# Patient Record
Sex: FEMALE | Race: BLACK OR AFRICAN AMERICAN | NOT HISPANIC OR LATINO | Employment: FULL TIME | URBAN - METROPOLITAN AREA
[De-identification: names, ages, dates, MRNs, and addresses within clinical notes are randomized per-mention and may not be internally consistent; named-entity substitution may affect disease eponyms.]

---

## 2017-05-01 ENCOUNTER — HISTORICAL (OUTPATIENT)
Dept: INTERNAL MEDICINE | Facility: CLINIC | Age: 47
End: 2017-05-01

## 2018-04-23 ENCOUNTER — HISTORICAL (OUTPATIENT)
Dept: ADMINISTRATIVE | Facility: HOSPITAL | Age: 48
End: 2018-04-23

## 2018-04-23 LAB
ABS NEUT (OLG): 2.84 X10(3)/MCL (ref 2.1–9.2)
ALBUMIN SERPL-MCNC: 3.8 GM/DL (ref 3.4–5)
ALBUMIN/GLOB SERPL: 1 RATIO (ref 1–2)
ALP SERPL-CCNC: 135 UNIT/L (ref 45–117)
ALT SERPL-CCNC: 17 UNIT/L (ref 12–78)
APPEARANCE, UA: CLEAR
AST SERPL-CCNC: 16 UNIT/L (ref 15–37)
BACTERIA #/AREA URNS AUTO: ABNORMAL /[HPF]
BASOPHILS # BLD AUTO: 0.04 X10(3)/MCL
BASOPHILS NFR BLD AUTO: 1 %
BILIRUB SERPL-MCNC: 0.4 MG/DL (ref 0.2–1)
BILIRUB UR QL STRIP: NEGATIVE
BILIRUBIN DIRECT+TOT PNL SERPL-MCNC: 0.1 MG/DL
BILIRUBIN DIRECT+TOT PNL SERPL-MCNC: 0.3 MG/DL
BUN SERPL-MCNC: 10 MG/DL (ref 7–18)
CALCIUM SERPL-MCNC: 9.8 MG/DL (ref 8.5–10.1)
CHLORIDE SERPL-SCNC: 103 MMOL/L (ref 98–107)
CHOLEST SERPL-MCNC: 181 MG/DL
CHOLEST/HDLC SERPL: 3 {RATIO} (ref 0–4.4)
CO2 SERPL-SCNC: 26 MMOL/L (ref 21–32)
COLOR UR: YELLOW
CREAT SERPL-MCNC: 0.8 MG/DL (ref 0.6–1.3)
EOSINOPHIL # BLD AUTO: 0.15 X10(3)/MCL
EOSINOPHIL NFR BLD AUTO: 3 %
ERYTHROCYTE [DISTWIDTH] IN BLOOD BY AUTOMATED COUNT: 13.3 % (ref 11.5–14.5)
EST. AVERAGE GLUCOSE BLD GHB EST-MCNC: 128 MG/DL
GLOBULIN SER-MCNC: 4.5 GM/ML (ref 2.3–3.5)
GLUCOSE (UA): NORMAL
GLUCOSE SERPL-MCNC: 88 MG/DL (ref 74–106)
HAV IGM SERPL QL IA: NONREACTIVE
HBA1C MFR BLD: 6.1 % (ref 4.2–6.3)
HBV CORE IGM SERPL QL IA: NONREACTIVE
HBV SURFACE AG SERPL QL IA: NEGATIVE
HCT VFR BLD AUTO: 39.4 % (ref 35–46)
HCV AB SERPL QL IA: NONREACTIVE
HDLC SERPL-MCNC: 60 MG/DL
HGB BLD-MCNC: 13.2 GM/DL (ref 12–16)
HGB UR QL STRIP: NEGATIVE
HIV 1+2 AB+HIV1 P24 AG SERPL QL IA: NONREACTIVE
HYALINE CASTS #/AREA URNS LPF: ABNORMAL /[LPF]
IMM GRANULOCYTES # BLD AUTO: 0.04 10*3/UL
IMM GRANULOCYTES NFR BLD AUTO: 1 %
KETONES UR QL STRIP: NEGATIVE
LDLC SERPL CALC-MCNC: 103 MG/DL (ref 0–130)
LEUKOCYTE ESTERASE UR QL STRIP: NEGATIVE
LYMPHOCYTES # BLD AUTO: 1.41 X10(3)/MCL
LYMPHOCYTES NFR BLD AUTO: 29 % (ref 13–40)
MCH RBC QN AUTO: 30.1 PG (ref 26–34)
MCHC RBC AUTO-ENTMCNC: 33.5 GM/DL (ref 31–37)
MCV RBC AUTO: 89.7 FL (ref 80–100)
MONOCYTES # BLD AUTO: 0.35 X10(3)/MCL
MONOCYTES NFR BLD AUTO: 7 % (ref 4–12)
NEUTROPHILS # BLD AUTO: 2.84 X10(3)/MCL
NEUTROPHILS NFR BLD AUTO: 59 X10(3)/MCL
NITRITE UR QL STRIP: NEGATIVE
PH UR STRIP: 6.5 [PH] (ref 4.5–8)
PLATELET # BLD AUTO: 382 X10(3)/MCL (ref 130–400)
PMV BLD AUTO: 9.4 FL (ref 7.4–10.4)
POTASSIUM SERPL-SCNC: 4.1 MMOL/L (ref 3.5–5.1)
PROT SERPL-MCNC: 8.3 GM/DL (ref 6.4–8.2)
PROT UR QL STRIP: 10 MG/DL
RBC # BLD AUTO: 4.39 X10(6)/MCL (ref 4–5.2)
RBC #/AREA URNS AUTO: ABNORMAL /[HPF]
RPR SER QL: NORMAL
SODIUM SERPL-SCNC: 137 MMOL/L (ref 136–145)
SP GR UR STRIP: 1.02 (ref 1–1.03)
SQUAMOUS #/AREA URNS LPF: ABNORMAL /[LPF]
T PALLIDUM AB SER QL: REACTIVE
TRIGL SERPL-MCNC: 88 MG/DL
TSH SERPL-ACNC: 0.86 MIU/L (ref 0.36–3.74)
UROBILINOGEN UR STRIP-ACNC: NORMAL
VLDLC SERPL CALC-MCNC: 18 MG/DL
WBC # SPEC AUTO: 4.8 X10(3)/MCL (ref 4.5–11)
WBC #/AREA URNS AUTO: ABNORMAL /HPF

## 2018-06-29 ENCOUNTER — HISTORICAL (OUTPATIENT)
Dept: RADIOLOGY | Facility: HOSPITAL | Age: 48
End: 2018-06-29

## 2019-07-03 LAB — POC BETA-HCG (QUAL): NEGATIVE

## 2019-07-16 ENCOUNTER — HISTORICAL (OUTPATIENT)
Dept: RADIOLOGY | Facility: HOSPITAL | Age: 49
End: 2019-07-16

## 2019-09-04 ENCOUNTER — HISTORICAL (OUTPATIENT)
Dept: INTERNAL MEDICINE | Facility: CLINIC | Age: 49
End: 2019-09-04

## 2019-09-04 LAB
ABS NEUT (OLG): 1.16 X10(3)/MCL (ref 2.1–9.2)
ALBUMIN SERPL-MCNC: 3.7 GM/DL (ref 3.4–5)
ALBUMIN/GLOB SERPL: 0.9 RATIO (ref 1.1–2)
ALP SERPL-CCNC: 138 UNIT/L (ref 45–117)
ALT SERPL-CCNC: 38 UNIT/L (ref 12–78)
AST SERPL-CCNC: 32 UNIT/L (ref 15–37)
BASOPHILS # BLD AUTO: 0 X10(3)/MCL (ref 0–0.2)
BASOPHILS NFR BLD AUTO: 1 %
BILIRUB SERPL-MCNC: 0.4 MG/DL (ref 0.2–1)
BILIRUBIN DIRECT+TOT PNL SERPL-MCNC: 0.1 MG/DL
BILIRUBIN DIRECT+TOT PNL SERPL-MCNC: 0.3 MG/DL
BUN SERPL-MCNC: 10 MG/DL (ref 7–18)
CALCIUM SERPL-MCNC: 8.5 MG/DL (ref 8.5–10.1)
CHLORIDE SERPL-SCNC: 105 MMOL/L (ref 98–107)
CHOLEST SERPL-MCNC: 226 MG/DL
CHOLEST/HDLC SERPL: 3 {RATIO} (ref 0–4.4)
CO2 SERPL-SCNC: 28 MMOL/L (ref 21–32)
CREAT SERPL-MCNC: 0.7 MG/DL (ref 0.6–1.3)
EOSINOPHIL # BLD AUTO: 0.2 X10(3)/MCL (ref 0–0.9)
EOSINOPHIL NFR BLD AUTO: 6 %
ERYTHROCYTE [DISTWIDTH] IN BLOOD BY AUTOMATED COUNT: 13.3 % (ref 11.5–14.5)
EST. AVERAGE GLUCOSE BLD GHB EST-MCNC: 120 MG/DL
GLOBULIN SER-MCNC: 3.9 GM/ML (ref 2.3–3.5)
GLUCOSE SERPL-MCNC: 88 MG/DL (ref 74–106)
HBA1C MFR BLD: 5.8 % (ref 4.2–6.3)
HCT VFR BLD AUTO: 36.9 % (ref 35–46)
HDLC SERPL-MCNC: 75 MG/DL
HGB BLD-MCNC: 12.3 GM/DL (ref 12–16)
IMM GRANULOCYTES # BLD AUTO: 0.02 10*3/UL
IMM GRANULOCYTES NFR BLD AUTO: 1 %
LDLC SERPL CALC-MCNC: 137 MG/DL (ref 0–130)
LYMPHOCYTES # BLD AUTO: 1.5 X10(3)/MCL (ref 0.6–4.6)
LYMPHOCYTES NFR BLD AUTO: 46 %
MCH RBC QN AUTO: 29.6 PG (ref 26–34)
MCHC RBC AUTO-ENTMCNC: 33.3 GM/DL (ref 31–37)
MCV RBC AUTO: 88.7 FL (ref 80–100)
MONOCYTES # BLD AUTO: 0.4 X10(3)/MCL (ref 0.1–1.3)
MONOCYTES NFR BLD AUTO: 11 %
NEUTROPHILS # BLD AUTO: 1.16 X10(3)/MCL (ref 2.1–9.2)
NEUTROPHILS NFR BLD AUTO: 35 %
PLATELET # BLD AUTO: 320 X10(3)/MCL (ref 130–400)
PMV BLD AUTO: 8.8 FL (ref 7.4–10.4)
POTASSIUM SERPL-SCNC: 3.9 MMOL/L (ref 3.5–5.1)
PROT SERPL-MCNC: 7.6 GM/DL (ref 6.4–8.2)
RBC # BLD AUTO: 4.16 X10(6)/MCL (ref 4–5.2)
SODIUM SERPL-SCNC: 138 MMOL/L (ref 136–145)
T3FREE SERPL-MCNC: 2.29 PG/ML (ref 2.18–3.98)
T4 FREE SERPL-MCNC: 0.82 NG/DL (ref 0.76–1.46)
TRIGL SERPL-MCNC: 68 MG/DL
TSH SERPL-ACNC: 1.18 MIU/L (ref 0.36–3.74)
VLDLC SERPL CALC-MCNC: 14 MG/DL
WBC # SPEC AUTO: 3.3 X10(3)/MCL (ref 4.5–11)

## 2019-11-06 ENCOUNTER — HISTORICAL (OUTPATIENT)
Dept: WOUND CARE | Facility: HOSPITAL | Age: 49
End: 2019-11-06

## 2019-11-06 LAB
ABS NEUT (OLG): 3.25 X10(3)/MCL (ref 2.1–9.2)
B-HCG SERPL QL: NEGATIVE
BASOPHILS # BLD AUTO: 0 X10(3)/MCL (ref 0–0.2)
BASOPHILS NFR BLD AUTO: 0 %
EOSINOPHIL # BLD AUTO: 0.4 X10(3)/MCL (ref 0–0.9)
EOSINOPHIL NFR BLD AUTO: 6 %
ERYTHROCYTE [DISTWIDTH] IN BLOOD BY AUTOMATED COUNT: 14.2 % (ref 11.5–14.5)
HCT VFR BLD AUTO: 34.2 % (ref 35–46)
HGB BLD-MCNC: 11.4 GM/DL (ref 12–16)
IMM GRANULOCYTES # BLD AUTO: 0.03 10*3/UL
IMM GRANULOCYTES NFR BLD AUTO: 0 %
LYMPHOCYTES # BLD AUTO: 2 X10(3)/MCL (ref 0.6–4.6)
LYMPHOCYTES NFR BLD AUTO: 31 %
MCH RBC QN AUTO: 29.8 PG (ref 26–34)
MCHC RBC AUTO-ENTMCNC: 33.3 GM/DL (ref 31–37)
MCV RBC AUTO: 89.3 FL (ref 80–100)
MONOCYTES # BLD AUTO: 0.6 X10(3)/MCL (ref 0.1–1.3)
MONOCYTES NFR BLD AUTO: 10 %
NEUTROPHILS # BLD AUTO: 3.25 X10(3)/MCL (ref 2.1–9.2)
NEUTROPHILS NFR BLD AUTO: 52 %
PLATELET # BLD AUTO: 354 X10(3)/MCL (ref 130–400)
PMV BLD AUTO: 8.4 FL (ref 7.4–10.4)
RBC # BLD AUTO: 3.83 X10(6)/MCL (ref 4–5.2)
WBC # SPEC AUTO: 6.2 X10(3)/MCL (ref 4.5–11)

## 2020-02-26 LAB
POC BETA-HCG (QUAL): NEGATIVE
POC BETA-HCG (QUAL): NEGATIVE

## 2020-07-09 ENCOUNTER — HISTORICAL (OUTPATIENT)
Dept: RADIOLOGY | Facility: HOSPITAL | Age: 50
End: 2020-07-09

## 2021-03-11 ENCOUNTER — HISTORICAL (OUTPATIENT)
Dept: RADIOLOGY | Facility: HOSPITAL | Age: 51
End: 2021-03-11

## 2021-06-08 ENCOUNTER — HISTORICAL (OUTPATIENT)
Dept: ANESTHESIOLOGY | Facility: HOSPITAL | Age: 51
End: 2021-06-08

## 2021-06-08 LAB
B-HCG SERPL QL: NEGATIVE
CRC RECOMMENDATION EXT: NORMAL

## 2021-08-25 ENCOUNTER — HISTORICAL (OUTPATIENT)
Dept: RADIOLOGY | Facility: HOSPITAL | Age: 51
End: 2021-08-25

## 2021-10-25 ENCOUNTER — HISTORICAL (OUTPATIENT)
Dept: RADIOLOGY | Facility: HOSPITAL | Age: 51
End: 2021-10-25

## 2021-11-29 ENCOUNTER — HISTORICAL (OUTPATIENT)
Dept: LAB | Facility: HOSPITAL | Age: 51
End: 2021-11-29

## 2021-11-29 LAB
ALBUMIN SERPL-MCNC: 3.8 GM/DL (ref 3.5–5)
ALBUMIN/GLOB SERPL: 1.2 RATIO (ref 1.1–2)
ALP SERPL-CCNC: 121 UNIT/L (ref 40–150)
ALT SERPL-CCNC: 102 UNIT/L (ref 0–55)
AST SERPL-CCNC: 62 UNIT/L (ref 5–34)
BILIRUB SERPL-MCNC: 0.4 MG/DL
BILIRUBIN DIRECT+TOT PNL SERPL-MCNC: 0.2 MG/DL (ref 0–0.5)
BILIRUBIN DIRECT+TOT PNL SERPL-MCNC: 0.2 MG/DL (ref 0–0.8)
BUN SERPL-MCNC: 16 MG/DL (ref 9.8–20.1)
CALCIUM SERPL-MCNC: 9.2 MG/DL (ref 8.7–10.5)
CHLORIDE SERPL-SCNC: 103 MMOL/L (ref 98–107)
CHOLEST SERPL-MCNC: 160 MG/DL
CHOLEST/HDLC SERPL: 3 {RATIO} (ref 0–5)
CO2 SERPL-SCNC: 29 MMOL/L (ref 22–29)
CREAT SERPL-MCNC: 0.64 MG/DL (ref 0.55–1.02)
EST. AVERAGE GLUCOSE BLD GHB EST-MCNC: 116.9 MG/DL
FSH SERPL-ACNC: 21.36 MIU/ML
GLOBULIN SER-MCNC: 3.1 GM/DL (ref 2.4–3.5)
GLUCOSE SERPL-MCNC: 88 MG/DL (ref 74–100)
HBA1C MFR BLD: 5.7 %
HDLC SERPL-MCNC: 53 MG/DL (ref 35–60)
LDLC SERPL CALC-MCNC: 92 MG/DL (ref 50–140)
POTASSIUM SERPL-SCNC: 4.4 MMOL/L (ref 3.5–5.1)
PROT SERPL-MCNC: 6.9 GM/DL (ref 6.4–8.3)
SODIUM SERPL-SCNC: 138 MMOL/L (ref 136–145)
TRIGL SERPL-MCNC: 74 MG/DL (ref 37–140)
VLDLC SERPL CALC-MCNC: 15 MG/DL

## 2021-12-03 ENCOUNTER — HISTORICAL (OUTPATIENT)
Dept: RADIOLOGY | Facility: HOSPITAL | Age: 51
End: 2021-12-03

## 2022-04-10 ENCOUNTER — HISTORICAL (OUTPATIENT)
Dept: ADMINISTRATIVE | Facility: HOSPITAL | Age: 52
End: 2022-04-10

## 2022-04-28 VITALS
SYSTOLIC BLOOD PRESSURE: 120 MMHG | BODY MASS INDEX: 29.45 KG/M2 | OXYGEN SATURATION: 98 % | DIASTOLIC BLOOD PRESSURE: 69 MMHG | HEIGHT: 62 IN | WEIGHT: 160.06 LBS

## 2022-04-30 NOTE — OP NOTE
Patient:   Radha Finch             MRN: [id number]            FIN: [id number]               Age:   50 years     Sex:  Female     :  1970   Associated Diagnoses:   Occult blood in stool   Author:   Katelynn Guevara MD      Operative Note   Operative Information   Date/ Time:  2021 11:30:00.     Procedures Performed: Procedure Code   Colonoscopy, flexible; diagnostic, including collection of specimen(s) by brushing or washing, when performed (separate procedure) (13176)..     Indications: 50-year-old -American female with recent fecal occult blood testing positive in need of first age-appropriate colonoscopy.     Preoperative Diagnosis: Occult blood in stool (NWO16-AT R19.5).     Postoperative Diagnosis: Occult blood in stool (CEN04-IN R19.5).     Surgeon: Katelynn Guevara MD.     Anesthesia: Intravenous MAC.     Speciman Removed: None.     Description of Procedure/Findings/    Complications: Patient was brought to the endoscopy suite laid in a left lateral decubitus position right side up.  Intravenous anesthesia was provided.  Digital rectal exam performed exhibiting good anal rectal tone and no masses.  An endoscope scope was then passed through the anus intubating the rectum and with gentle deflation reaching the cecum.  Upon reaching the cecum pictures were taken the scope was then slowly withdrawn.  Overall the cecum ascending transverse descending and rectosigmoid colon all appear to be grossly normal without mass polyp or ulceration seen.  Scope was retroflexed in the distal rectum revealing normal-appearing perianal mucosa no significant hemorrhoids.  The colon was evacuated of air.  The patient was relieved of anesthesia stable condition transfer postanesthesia care unit..     Esimated blood loss: No blood loss.     Findings: Normal colonoscopy.     Complications: None.     Notes: Recommendation repeat colonoscopy at a 10-year interval .

## 2022-05-03 NOTE — HISTORICAL [facility] CERNER
This is a historical note converted from [employer]. Formatting and pictures may have been removed.  Please reference [employer] for original formatting and attached multimedia. Chief Complaint  annual  History of Present Illness  Pt is  here for annual gyn exam. Denies hx of abnormal pap. Last pap 3/2016=NIL; HPV negative. Pt currently on sprintec for contraception and wishes to continue. LMP 3-31-18. States has regular monthly period lasting 5 days. Changes pad 5-6x per day. Reports mild cramping during menses otherwise denies abd/pelvic pain. Denies breast complaints. Pt with personal hx of left breast biopsy that was benign. Fly hx includes paternal aunt with breast cancer; denies fly hx of uterine, ovarian, or colon cancer. Pt is nonsmoker.  Review of Systems  Negative except HPI  Physical Exam  Vitals & Measurements  T:?36.9? ?C (Oral)? HR:?83(Peripheral)? RR:?18? BP:?119/77?  HT:?157.48?cm? HT:?157.48?cm? WT:?74.6?kg? WT:?74.6?kg? BMI:?30.08?  General: Alert and oriented, No acute distress.  Breast: No mass, No tenderness, No discharge.  Gastrointestinal: Soft, Non-tender.  Gynecology:  Labia: Bilateral, Majora, Minora, Within normal limits.  Vagina: Within normal limits. no abnormal discharge  Cervix: No cervical motion tenderness, No lesions.  Uterus: Mobile, Not tender. 12 weeks  Ovaries: Not tender, No mass.  Integumentary: Warm, Dry.  Neurologic: Alert, Oriented.  Psychiatric: Cooperative, Appropriate mood & affect.  ?   exam done by Genet Kennedy NP student; I was present for entire exam  Assessment/Plan  1.?Visit for routine gyn exam  ? pelvic; pap utd per acog guidelines  ? discussed finding?on pelvic?US done after last visit as we could not get a hold?of her, pt is pleased with sprintec and wishes to continue-instructed pt contact clinic with any?changes in her bleeding pattern  Ordered:  Clinic Follow up, *Est. 19 3:00:00 CDT, 1 Year, Order for future visit, Visit for routine gyn exam, [facility]  [facility] Est 40-64 years [address] PC, Visit for routine gyn exam  Routine screening for STI (sexually transmitted infection)  Contraception  Visit for screening mammogram, [facility], 04/23/18 10:13:00 CDT  ?  2.?Routine screening for STI (sexually transmitted infection)  Ordered:  Chlamydia trach and N. gonorrhea PCR, Routine collect, Cervical, Order for future visit, 04/23/18 10:13:00 CDT, Stop date 04/23/18 10:13:00 CDT, Nurse collect, Routine screening for STI (sexually transmitted infection)  Hepatitis Panel [facility], Now collect, 04/23/18 10:13:00 CDT, Blood, Order for future visit, Stop date 04/23/18 10:13:00 CDT, Lab Collect, Routine screening for STI (sexually transmitted infection), 04/23/18 10:13:00 CDT  HIV 1 and 2, Now collect, 04/23/18 10:13:00 CDT, Blood, Order for future visit, Stop date 04/23/18 10:13:00 CDT, Lab Collect, Routine screening for STI (sexually transmitted infection), 04/23/18 10:13:00 CDT  [facility] Est 40-64 years [address] PC, Visit for routine gyn exam  Routine screening for STI (sexually transmitted infection)  Contraception  Visit for screening mammogram, [facility], 04/23/18 10:13:00 CDT  Syphilis Antibody (with Reflex RPR), Routine collect, 04/23/18 10:13:00 CDT, Blood, Order for future visit, Stop date 04/23/18 10:13:00 CDT, Lab Collect, Routine screening for STI (sexually transmitted infection), 04/23/18 10:13:00 CDT  Wet Prep Smear, Routine collect, Vaginal, Order for future visit, 04/23/18 10:13:00 CDT, Stop date 04/23/18 10:13:00 CDT, Nurse collect, Routine screening for STI (sexually transmitted infection), 04/23/18 10:13:00 CDT  ?  3.?Contraception  ? refill sprintec; #2?per cdc guidelines for criteria for contraception use (advantages outweigh risk) for age over 40; instructed pt to contact clinic with any new diagnosis as that may affect the method that we use in the future  Ordered:  Preventative Health Care Est  40-64 years [id number] PC, Visit for routine gyn exam  Routine screening for STI (sexually transmitted infection)  Contraception  Visit for screening mammogram, [facility] , 18 10:13:00 CDT  ?  4.?Visit for screening mammogram  Ordered:  MG Screening, Routine, *Est. 18 3:00:00 CDT, Screening, None, Ambulatory, Rad Type, Order for future visit, Visit for screening mammogram, Schedule this test, [facility], Follow Breast Imaging Order Set Protocol, *Est. 18 3:00:00 CDT  Preventative Health Care Est 40-64 years [id number] PC, Visit for routine gyn exam  Routine screening for STI (sexually transmitted infection)  Contraception  Visit for screening mammogram, [facility], 18 10:13:00 CDT  ?  Orders:  ethinyl estradiol-norgestimate, 1 tab(s), Oral, Daily, # 28 tab(s), 11 Refill(s), Pharmacy: [id number]   Problem List/Past Medical History  Ongoing  WPW (Hang-Parkinson-White syndrome)  Historical  No qualifying data  Procedure/Surgical History   section.  Medications  Sprintec 0.25 mg-35 mcg oral tablet, 1 tab(s), Oral, Daily, 11 refills  Sprintec 0.25 mg-35 mcg oral tablet, 1 tab(s), Oral, Daily, 11 refills  Ventolin HFA 90 mcg/inh inhalation aerosol, 2 puff(s), INH, q4hr, PRN, 1 refills  Allergies  penicillins  Social History  Alcohol - Denies Alcohol Use, 2015  Past, 2015  Employment/School  Activity level: Moderate physical work. Operates hazardous equipment: No. Workplace hazards: Heavy lifting/twisting, Repetitive motion., 2015  Exercise  Exercise frequency: Daily. Self assessment: Fair condition. Exercise type: Walking., 2015  Home/Environment  Lives with Children, Spouse. Living situation: Home/Independent. Alcohol abuse in household: No. Substance abuse in household: No. Smoker in household: No. Injuries/Abuse/Neglect in household: No. Feels unsafe at home: No. Safe place to go: Yes. Family/Friends available for support:  Yes. Concern for family members at home: No. Major illness in household: No. Financial concerns: No. TV/Computer concerns: No., 03/24/2015  Nutrition/Health  Regular, Caffeine intake amount: sodas Dr. Pepper 3 20oz per day. Wants to lose weight: Yes. Sleeping concerns: Yes. Feels highly stressed: Yes., 03/24/2015  Other  Substance Abuse - Denies Substance Abuse, 03/19/2015  Never, 09/14/2015  Tobacco - Denies Tobacco Use, 03/19/2015  Never smoker, 09/14/2015  Family History  Coronary artery disease: Father.  Hypertension.: Mother.  Stroke: Father.  Diagnostic Results  (05/01/2017 10:30 CDT US Pelvic Non-Obsetrics)  * Final Report *  ?  Reason For Exam  Left lower quadrant pain;Pelvic Pain  ?  Radiology Report  US Transvaginal Non-OB, US Pelvic Non-Obstetrics  ?  REASON FOR STUDY: Left lower quadrant and pelvic pain  ?  TECHNIQUE: Transabdominal and endovaginal ultrasound of the pelvis.?  ?  COMPARISON: No relevant comparison studies available at the time of  dictation?  ?  FINDINGS:?  The uterus measures 12.8 x 7.3 x 7.4 cm. The myometrium is  heterogeneous. There are several hypoechoic areas noted. Larger  lesions include a posterior fibroid measuring 2.6 x 3.6 x 2.4 cm, a  left fibroid measuring 2.0 x 2.2 x 1.4 cm and an anterior fibroid  measuring 1.9 x 1.9 x 1.9 cm. Endometrial stripe measures 7 mm.  ?  The right ovary measures 3.8 x 1.7 x 2.2 cm. It has a normal  appearance. The left ovary is not identified. Trace free fluid within  the cul-de-sac.  ?  IMPRESSION:?  1. Enlarged and heterogeneous uterus with several fibroids. The  largest identified fibroid measures up to 3.6 cm.  2. Left ovary not identified.  3. Trace pelvic free fluid.  ?  ?  Signature Line  Electronically Signed By: Thomas Medeiros MD  Date/Time Signed: 05/01/2017 11:00  ?  ?  This document has an image  ?  Result type:???????  US Pelvic Non-Obsetrics  Result date:???????  May 01, 2017 10:30 CDT  Result status:???????  Auth  (Verified)  Result title:???????  US Pelvic Non-Obstetrics  Performed by:???????  Thomas Medeiros MD on May 01, 2017 10:54 CDT  Verified by:???????  Thomas Medeiros MD on May 01, 2017 11:00 CDT  Encounter info:???????  [id number], [facility], Outpatient, 5/1/2017 - 5/1/2017  ?  ?  ? [1]     [1]?US Pelvic Non-Obsetrics; Thomas Medeiros MD 05/01/2017 10:30 CDT

## 2022-05-30 ENCOUNTER — LAB VISIT (OUTPATIENT)
Dept: LAB | Facility: HOSPITAL | Age: 52
End: 2022-05-30
Attending: FAMILY MEDICINE
Payer: MEDICAID

## 2022-05-30 DIAGNOSIS — R42 DIZZINESS AND GIDDINESS: Primary | ICD-10-CM

## 2022-05-30 DIAGNOSIS — R74.8 ACID PHOSPHATASE ELEVATED: ICD-10-CM

## 2022-05-30 LAB
ALBUMIN SERPL-MCNC: 3.8 GM/DL (ref 3.5–5)
ALBUMIN/GLOB SERPL: 1.1 RATIO (ref 1.1–2)
ALP SERPL-CCNC: 148 UNIT/L (ref 40–150)
ALT SERPL-CCNC: 18 UNIT/L (ref 0–55)
AST SERPL-CCNC: 16 UNIT/L (ref 5–34)
BILIRUBIN DIRECT+TOT PNL SERPL-MCNC: 0.3 MG/DL
BUN SERPL-MCNC: 23 MG/DL (ref 9.8–20.1)
CALCIUM SERPL-MCNC: 9.5 MG/DL (ref 8.4–10.2)
CHLORIDE SERPL-SCNC: 99 MMOL/L (ref 98–107)
CO2 SERPL-SCNC: 30 MMOL/L (ref 22–29)
CREAT SERPL-MCNC: 0.85 MG/DL (ref 0.55–1.02)
GLOBULIN SER-MCNC: 3.5 GM/DL (ref 2.4–3.5)
GLUCOSE SERPL-MCNC: 88 MG/DL (ref 74–100)
POTASSIUM SERPL-SCNC: 3.7 MMOL/L (ref 3.5–5.1)
PROT SERPL-MCNC: 7.3 GM/DL (ref 6.4–8.3)
SODIUM SERPL-SCNC: 136 MMOL/L (ref 136–145)

## 2022-05-30 PROCEDURE — 36415 COLL VENOUS BLD VENIPUNCTURE: CPT

## 2022-05-30 PROCEDURE — 80053 COMPREHEN METABOLIC PANEL: CPT

## 2022-09-17 ENCOUNTER — HISTORICAL (OUTPATIENT)
Dept: ADMINISTRATIVE | Facility: HOSPITAL | Age: 52
End: 2022-09-17
Payer: MEDICAID

## 2022-11-09 ENCOUNTER — LAB VISIT (OUTPATIENT)
Dept: LAB | Facility: HOSPITAL | Age: 52
End: 2022-11-09
Attending: FAMILY MEDICINE
Payer: MEDICAID

## 2022-11-09 DIAGNOSIS — I10 HYPERTENSION, UNSPECIFIED TYPE: Primary | ICD-10-CM

## 2022-11-09 DIAGNOSIS — B37.31 VAGINAL YEAST INFECTION: ICD-10-CM

## 2022-11-09 DIAGNOSIS — Z08 VAGINAL PAP SMEAR FOLLOWING HYSTERECTOMY FOR MALIGNANCY: ICD-10-CM

## 2022-11-09 DIAGNOSIS — R73.03 PREDIABETES: ICD-10-CM

## 2022-11-09 DIAGNOSIS — J45.20 MILD INTERMITTENT ASTHMA WITHOUT COMPLICATION: ICD-10-CM

## 2022-11-09 DIAGNOSIS — Z90.710 VAGINAL PAP SMEAR FOLLOWING HYSTERECTOMY FOR MALIGNANCY: ICD-10-CM

## 2022-11-09 LAB
ALBUMIN SERPL-MCNC: 3.8 GM/DL (ref 3.5–5)
ALBUMIN/GLOB SERPL: 1.1 RATIO (ref 1.1–2)
ALP SERPL-CCNC: 169 UNIT/L (ref 40–150)
ALT SERPL-CCNC: 20 UNIT/L (ref 0–55)
AST SERPL-CCNC: 23 UNIT/L (ref 5–34)
BILIRUBIN DIRECT+TOT PNL SERPL-MCNC: 0.3 MG/DL
BUN SERPL-MCNC: 12 MG/DL (ref 9.8–20.1)
CALCIUM SERPL-MCNC: 9.5 MG/DL (ref 8.4–10.2)
CHLORIDE SERPL-SCNC: 104 MMOL/L (ref 98–107)
CHOLEST SERPL-MCNC: 182 MG/DL
CHOLEST/HDLC SERPL: 3 {RATIO} (ref 0–5)
CO2 SERPL-SCNC: 27 MMOL/L (ref 22–29)
CREAT SERPL-MCNC: 0.76 MG/DL (ref 0.55–1.02)
DEPRECATED CALCIDIOL+CALCIFEROL SERPL-MC: 23.7 NG/ML (ref 30–80)
ERYTHROCYTE [DISTWIDTH] IN BLOOD BY AUTOMATED COUNT: 14.3 % (ref 11.5–17)
EST. AVERAGE GLUCOSE BLD GHB EST-MCNC: 119.8 MG/DL
GFR SERPLBLD CREATININE-BSD FMLA CKD-EPI: >60 MLS/MIN/1.73/M2
GLOBULIN SER-MCNC: 3.5 GM/DL (ref 2.4–3.5)
GLUCOSE SERPL-MCNC: 93 MG/DL (ref 74–100)
HBA1C MFR BLD: 5.8 %
HCT VFR BLD AUTO: 36.7 % (ref 37–47)
HDLC SERPL-MCNC: 63 MG/DL (ref 35–60)
HGB BLD-MCNC: 11.9 GM/DL (ref 12–16)
LDLC SERPL CALC-MCNC: 102 MG/DL (ref 50–140)
MCH RBC QN AUTO: 29.1 PG (ref 27–31)
MCHC RBC AUTO-ENTMCNC: 32.4 MG/DL (ref 33–36)
MCV RBC AUTO: 89.7 FL (ref 80–94)
PLATELET # BLD AUTO: 329 X10(3)/MCL (ref 130–400)
PMV BLD AUTO: 9 FL (ref 7.4–10.4)
POTASSIUM SERPL-SCNC: 4.1 MMOL/L (ref 3.5–5.1)
PROT SERPL-MCNC: 7.3 GM/DL (ref 6.4–8.3)
RBC # BLD AUTO: 4.09 X10(6)/MCL (ref 4.2–5.4)
SODIUM SERPL-SCNC: 139 MMOL/L (ref 136–145)
TRIGL SERPL-MCNC: 86 MG/DL (ref 37–140)
TSH SERPL-ACNC: 0.97 UIU/ML (ref 0.35–4.94)
VLDLC SERPL CALC-MCNC: 17 MG/DL
WBC # SPEC AUTO: 3.8 X10(3)/MCL (ref 4.5–11.5)

## 2022-11-09 PROCEDURE — 82306 VITAMIN D 25 HYDROXY: CPT

## 2022-11-09 PROCEDURE — 83036 HEMOGLOBIN GLYCOSYLATED A1C: CPT

## 2022-11-09 PROCEDURE — 84443 ASSAY THYROID STIM HORMONE: CPT

## 2022-11-09 PROCEDURE — 80061 LIPID PANEL: CPT

## 2022-11-09 PROCEDURE — 80053 COMPREHEN METABOLIC PANEL: CPT

## 2022-11-09 PROCEDURE — 85027 COMPLETE CBC AUTOMATED: CPT

## 2022-11-09 PROCEDURE — 36415 COLL VENOUS BLD VENIPUNCTURE: CPT

## 2023-01-10 ENCOUNTER — HOSPITAL ENCOUNTER (OUTPATIENT)
Dept: RADIOLOGY | Facility: HOSPITAL | Age: 53
Discharge: HOME | End: 2023-01-10
Attending: FAMILY MEDICINE
Payer: MEDICAID

## 2023-01-10 DIAGNOSIS — M54.6 PAIN IN THORACIC SPINE: ICD-10-CM

## 2023-01-10 PROCEDURE — 72070 X-RAY EXAM THORAC SPINE 2VWS: CPT | Mod: TC

## 2023-01-31 ENCOUNTER — DOCUMENTATION ONLY (OUTPATIENT)
Dept: ADMINISTRATIVE | Facility: HOSPITAL | Age: 53
End: 2023-01-31
Payer: MEDICAID

## 2023-03-06 ENCOUNTER — HOSPITAL ENCOUNTER (OUTPATIENT)
Dept: RADIOLOGY | Facility: HOSPITAL | Age: 53
Discharge: HOME | End: 2023-03-06
Attending: FAMILY MEDICINE
Payer: MEDICAID

## 2023-03-06 DIAGNOSIS — Z12.31 BREAST CANCER SCREENING BY MAMMOGRAM: ICD-10-CM

## 2023-03-06 PROCEDURE — 77067 MAMMO DIGITAL SCREENING BILAT WITH TOMO: ICD-10-PCS | Mod: 26,,, | Performed by: STUDENT IN AN ORGANIZED HEALTH CARE EDUCATION/TRAINING PROGRAM

## 2023-03-06 PROCEDURE — 77063 BREAST TOMOSYNTHESIS BI: CPT | Mod: 26,,, | Performed by: STUDENT IN AN ORGANIZED HEALTH CARE EDUCATION/TRAINING PROGRAM

## 2023-03-06 PROCEDURE — 77067 SCR MAMMO BI INCL CAD: CPT | Mod: 26,,, | Performed by: STUDENT IN AN ORGANIZED HEALTH CARE EDUCATION/TRAINING PROGRAM

## 2023-03-06 PROCEDURE — 77063 MAMMO DIGITAL SCREENING BILAT WITH TOMO: ICD-10-PCS | Mod: 26,,, | Performed by: STUDENT IN AN ORGANIZED HEALTH CARE EDUCATION/TRAINING PROGRAM

## 2023-03-06 PROCEDURE — 77067 SCR MAMMO BI INCL CAD: CPT | Mod: TC

## 2023-05-15 ENCOUNTER — LAB VISIT (OUTPATIENT)
Dept: LAB | Facility: HOSPITAL | Age: 53
End: 2023-05-15
Attending: FAMILY MEDICINE
Payer: MEDICAID

## 2023-05-15 DIAGNOSIS — R73.03 PREDIABETES: Primary | ICD-10-CM

## 2023-05-15 DIAGNOSIS — E78.2 MIXED HYPERLIPIDEMIA: ICD-10-CM

## 2023-05-15 LAB
ALBUMIN SERPL-MCNC: 3.5 G/DL (ref 3.5–5)
ALBUMIN/GLOB SERPL: 1 RATIO (ref 1.1–2)
ALP SERPL-CCNC: 130 UNIT/L (ref 40–150)
ALT SERPL-CCNC: 14 UNIT/L (ref 0–55)
AST SERPL-CCNC: 18 UNIT/L (ref 5–34)
BILIRUBIN DIRECT+TOT PNL SERPL-MCNC: 0.3 MG/DL
BUN SERPL-MCNC: 17 MG/DL (ref 9.8–20.1)
CALCIUM SERPL-MCNC: 9.1 MG/DL (ref 8.4–10.2)
CHLORIDE SERPL-SCNC: 101 MMOL/L (ref 98–107)
CHOLEST SERPL-MCNC: 161 MG/DL
CHOLEST/HDLC SERPL: 3 {RATIO} (ref 0–5)
CO2 SERPL-SCNC: 28 MMOL/L (ref 22–29)
CREAT SERPL-MCNC: 0.75 MG/DL (ref 0.55–1.02)
EST. AVERAGE GLUCOSE BLD GHB EST-MCNC: 116.9 MG/DL
GFR SERPLBLD CREATININE-BSD FMLA CKD-EPI: >60 MLS/MIN/1.73/M2
GLOBULIN SER-MCNC: 3.6 GM/DL (ref 2.4–3.5)
GLUCOSE SERPL-MCNC: 106 MG/DL (ref 74–100)
HBA1C MFR BLD: 5.7 %
HDLC SERPL-MCNC: 51 MG/DL (ref 35–60)
LDLC SERPL CALC-MCNC: 94 MG/DL (ref 50–140)
POTASSIUM SERPL-SCNC: 3.5 MMOL/L (ref 3.5–5.1)
PROT SERPL-MCNC: 7.1 GM/DL (ref 6.4–8.3)
SODIUM SERPL-SCNC: 138 MMOL/L (ref 136–145)
TRIGL SERPL-MCNC: 78 MG/DL (ref 37–140)
VLDLC SERPL CALC-MCNC: 16 MG/DL

## 2023-05-15 PROCEDURE — 80053 COMPREHEN METABOLIC PANEL: CPT

## 2023-05-15 PROCEDURE — 36415 COLL VENOUS BLD VENIPUNCTURE: CPT

## 2023-05-15 PROCEDURE — 83036 HEMOGLOBIN GLYCOSYLATED A1C: CPT

## 2023-05-15 PROCEDURE — 80061 LIPID PANEL: CPT

## 2023-07-18 ENCOUNTER — HOSPITAL ENCOUNTER (EMERGENCY)
Facility: HOSPITAL | Age: 53
Discharge: HOME | End: 2023-07-18
Attending: INTERNAL MEDICINE
Payer: MEDICAID

## 2023-07-18 VITALS
SYSTOLIC BLOOD PRESSURE: 116 MMHG | TEMPERATURE: 98 F | HEIGHT: 62 IN | RESPIRATION RATE: 18 BRPM | DIASTOLIC BLOOD PRESSURE: 71 MMHG | HEART RATE: 71 BPM | OXYGEN SATURATION: 100 % | WEIGHT: 173 LBS | BODY MASS INDEX: 31.83 KG/M2

## 2023-07-18 DIAGNOSIS — H60.12 CELLULITIS OF HELIX OF LEFT EAR: Primary | ICD-10-CM

## 2023-07-18 PROCEDURE — 99284 EMERGENCY DEPT VISIT MOD MDM: CPT | Mod: 25

## 2023-07-18 PROCEDURE — 25000003 PHARM REV CODE 250: Performed by: NURSE PRACTITIONER

## 2023-07-18 PROCEDURE — 10060 I&D ABSCESS SIMPLE/SINGLE: CPT

## 2023-07-18 RX ORDER — HYDROCODONE BITARTRATE AND ACETAMINOPHEN 7.5; 325 MG/1; MG/1
1 TABLET ORAL EVERY 6 HOURS PRN
Qty: 12 TABLET | Refills: 0 | Status: ON HOLD | OUTPATIENT
Start: 2023-07-18 | End: 2023-07-24 | Stop reason: HOSPADM

## 2023-07-18 RX ORDER — MUPIROCIN 20 MG/G
OINTMENT TOPICAL 3 TIMES DAILY
Qty: 22 G | Refills: 0 | Status: SHIPPED | OUTPATIENT
Start: 2023-07-18 | End: 2023-07-25

## 2023-07-18 RX ORDER — SULFAMETHOXAZOLE AND TRIMETHOPRIM 800; 160 MG/1; MG/1
1 TABLET ORAL 2 TIMES DAILY
Qty: 14 TABLET | Refills: 0 | Status: ON HOLD | OUTPATIENT
Start: 2023-07-18 | End: 2023-07-24 | Stop reason: HOSPADM

## 2023-07-18 RX ORDER — LIDOCAINE HYDROCHLORIDE 10 MG/ML
5 INJECTION, SOLUTION EPIDURAL; INFILTRATION; INTRACAUDAL; PERINEURAL
Status: COMPLETED | OUTPATIENT
Start: 2023-07-18 | End: 2023-07-18

## 2023-07-18 RX ORDER — HYDROCODONE BITARTRATE AND ACETAMINOPHEN 10; 325 MG/1; MG/1
1 TABLET ORAL
Status: COMPLETED | OUTPATIENT
Start: 2023-07-18 | End: 2023-07-18

## 2023-07-18 RX ADMIN — LIDOCAINE HYDROCHLORIDE 50 MG: 10 INJECTION, SOLUTION EPIDURAL; INFILTRATION; INTRACAUDAL; PERINEURAL at 06:07

## 2023-07-18 RX ADMIN — HYDROCODONE BITARTRATE AND ACETAMINOPHEN 1 TABLET: 10; 325 TABLET ORAL at 06:07

## 2023-07-18 NOTE — Clinical Note
"Radha Carrillo" Stef was seen and treated in our emergency department on 7/18/2023.  She may return to work on 07/21/2023.       If you have any questions or concerns, please don't hesitate to call.      TIARA Jackson"

## 2023-07-18 NOTE — ED PROVIDER NOTES
Encounter Date: 7/18/2023       History     Chief Complaint   Patient presents with    Otalgia     Pt c/o LT ear swelling and pain since Wednesday. Seen at urgent care on Sunday and given clindamycin. Not getting any better.     Patient is a 52-year-old female presents emerged department complaints of left external ear pain and swelling since Wednesday.  States he was seen in urgent care and placed on clindamycin but not getting any better.  Patient has significant redness erythema and swelling to the external ear around the helical pérez.  She denies any fevers chills.    Review of patient's allergies indicates:   Allergen Reactions    Penicillins Shortness Of Breath     Other reaction(s): sob     No past medical history on file.  Past Surgical History:   Procedure Laterality Date    COLONOSCOPY  06/08/2021    CYSTOSCOPY  04/27/2022    HYSTERECTOMY, VAGINAL, LAPAROSCOPY-ASSISTED, WITH SALPINGO-OOPHORECTOY  04/27/2022     No family history on file.     Review of Systems   Constitutional:  Negative for activity change, appetite change and fever.   HENT:  Positive for ear discharge and ear pain. Negative for congestion, dental problem, drooling and sore throat.    Eyes:  Negative for pain, discharge, redness and itching.   Respiratory:  Negative for apnea and shortness of breath.    Cardiovascular:  Negative for chest pain.   Gastrointestinal:  Negative for nausea.   Genitourinary:  Negative for dysuria.   Musculoskeletal:  Negative for back pain.   Skin:  Negative for rash.   Neurological:  Negative for dizziness, facial asymmetry and weakness.   Hematological:  Does not bruise/bleed easily.   Psychiatric/Behavioral:  Negative for agitation and behavioral problems.    All other systems reviewed and are negative.    Physical Exam     Initial Vitals [07/18/23 1707]   BP Pulse Resp Temp SpO2   (!) 147/84 77 18 98.2 °F (36.8 °C) 100 %      MAP       --         Physical Exam    Nursing note and vitals  reviewed.  Constitutional: Vital signs are normal. She appears well-developed and well-nourished.  Non-toxic appearance. She does not have a sickly appearance.   HENT:   Head: Normocephalic and atraumatic.   Left external knee ear near the helical cruise is red erythematous swollen and very tender to touch.  Difficult to determine if there is any significant fluctuance due to patient's being very sensitive on exam and not tolerating palpation much.   Eyes: Conjunctivae, EOM and lids are normal. Lids are everted and swept, no foreign bodies found.   Neck: Trachea normal and phonation normal. Neck supple. No thyroid mass and no thyromegaly present.   Normal range of motion.   Full passive range of motion without pain.     Cardiovascular:  Normal rate, regular rhythm, S1 normal, S2 normal, normal heart sounds, intact distal pulses and normal pulses.           Musculoskeletal:      Cervical back: Full passive range of motion without pain, normal range of motion and neck supple.     Lymphadenopathy:     She has no cervical adenopathy.   Neurological: She is alert and oriented to person, place, and time. She has normal strength and normal reflexes.   Skin: Skin is warm, dry and intact. Capillary refill takes less than 2 seconds.   Psychiatric: She has a normal mood and affect. Her speech is normal and behavior is normal. Judgment normal. Cognition and memory are normal.       ED Course   Procedures  Labs Reviewed - No data to display       Imaging Results    None          Medications   LIDOcaine (PF) 10 mg/ml (1%) injection 50 mg (50 mg Infiltration Given by Provider 7/18/23 1841)   HYDROcodone-acetaminophen  mg per tablet 1 tablet (1 tablet Oral Given 7/18/23 1841)                 ED Course as of 07/18/23 1902 Tue Jul 18, 2023 1831 Risks benefits discussed of I&D or 18 gauge needle probe of this area.  I gave her this option and had some shared decision-making due to her intolerance of palpation around this  area due to it being so sensitive and tender.  From what I could feel it appears to be more indurated and firm so I do not suspect much but she does report that she had some foul-smelling drainage 2 days ago when this initially started.  Patient did elect to have lidocaine injected in this area to be investigated with 18 gauge needle for possible abscess.  Patient being moved from consult to stretcher so we can perform this procedure. [SL]      ED Course User Index  [SL] Salvador Montano, TIARA          Medical Decision Making  Patient is a 52-year-old female presents emerged department continued left ear pain and swelling despite antibiotics prescribed for an infection.  She states she is been taking clindamycin for 2 days and states pain has worsened as well as swelling.  She reports this wound did drain some foul-smelling pus like discharge when she initially started having the pain but has not drained since.    Problems Addressed:  Cellulitis of helix of left ear: acute illness or injury     Details: Patient has cellulitis of the left external ear.  Patient is on clindamycin but I do not feel with her infection that seeing coverage would be adequate so we will add Bactrim today as well as topical mupirocin ointment.  I&D was done here which did relieve about 2 meals of purulent bloody drainage from this abscess.  She does report some immediate relief when abscess was drained.  Swelling did improve as well as the redness and discoloration.  Medications since the pharmacy.  Pain medicine given here.  Discussed starting antibiotics since possible.  Strict ER return precautions discussed for any worsening or changing symptoms.  Patient was aware plan of care and had no questions or concerns prior to discharge.    Amount and/or Complexity of Data Reviewed  External Data Reviewed: labs, radiology and notes.            Clinical Impression:   Final diagnoses:  [H60.12] Cellulitis of helix of left ear (Primary)        ED  Disposition Condition    Discharge Stable          ED Prescriptions       Medication Sig Dispense Start Date End Date Auth. Provider    sulfamethoxazole-trimethoprim 800-160mg (BACTRIM DS) 800-160 mg Tab Take 1 tablet by mouth 2 (two) times daily. for 7 days 14 tablet 7/18/2023 7/25/2023 TIARA Jackson    mupirocin (BACTROBAN) 2 % ointment Apply topically 3 (three) times daily. for 7 days 22 g 7/18/2023 7/25/2023 TIARA Jackson    HYDROcodone-acetaminophen (NORCO) 7.5-325 mg per tablet Take 1 tablet by mouth every 6 (six) hours as needed for Pain. 12 tablet 7/18/2023 -- TIARA Jackson          Follow-up Information       Follow up With Specialties Details Why Contact Info    Dina Sofia MD Family Medicine Schedule an appointment as soon as possible for a visit  As needed, For ER Follow Up. [address]  [facility] [place] [address]  [phone]               TIARA Jackson  07/18/23 [id number]

## 2023-07-21 ENCOUNTER — HOSPITAL ENCOUNTER (INPATIENT)
Facility: HOSPITAL | Age: 53
LOS: 3 days | Discharge: HOME | DRG: 156 | End: 2023-07-24
Attending: FAMILY MEDICINE | Admitting: FAMILY MEDICINE
Payer: MEDICAID

## 2023-07-21 ENCOUNTER — ANESTHESIA (OUTPATIENT)
Dept: SURGERY | Facility: HOSPITAL | Age: 53
DRG: 156 | End: 2023-07-21
Payer: MEDICAID

## 2023-07-21 ENCOUNTER — ANESTHESIA EVENT (OUTPATIENT)
Dept: SURGERY | Facility: HOSPITAL | Age: 53
DRG: 156 | End: 2023-07-21
Payer: MEDICAID

## 2023-07-21 DIAGNOSIS — L03.90 CELLULITIS, UNSPECIFIED CELLULITIS SITE: ICD-10-CM

## 2023-07-21 DIAGNOSIS — R07.9 CHEST PAIN: ICD-10-CM

## 2023-07-21 DIAGNOSIS — H60.12 CELLULITIS OF AURICLE OF LEFT EAR: ICD-10-CM

## 2023-07-21 LAB
ALBUMIN SERPL-MCNC: 3.7 G/DL (ref 3.5–5)
ALBUMIN/GLOB SERPL: 0.9 RATIO (ref 1.1–2)
ALP SERPL-CCNC: 138 UNIT/L (ref 40–150)
ALT SERPL-CCNC: 12 UNIT/L (ref 0–55)
AST SERPL-CCNC: 17 UNIT/L (ref 5–34)
BASOPHILS # BLD AUTO: 0.03 X10(3)/MCL
BASOPHILS NFR BLD AUTO: 0.6 %
BILIRUBIN DIRECT+TOT PNL SERPL-MCNC: 0.3 MG/DL
BUN SERPL-MCNC: 12 MG/DL (ref 9.8–20.1)
CALCIUM SERPL-MCNC: 10 MG/DL (ref 8.4–10.2)
CHLORIDE SERPL-SCNC: 106 MMOL/L (ref 98–107)
CO2 SERPL-SCNC: 25 MMOL/L (ref 22–29)
CREAT SERPL-MCNC: 0.95 MG/DL (ref 0.55–1.02)
EOSINOPHIL # BLD AUTO: 0.21 X10(3)/MCL (ref 0–0.9)
EOSINOPHIL NFR BLD AUTO: 4 %
ERYTHROCYTE [DISTWIDTH] IN BLOOD BY AUTOMATED COUNT: 13.7 % (ref 11.5–17)
GFR SERPLBLD CREATININE-BSD FMLA CKD-EPI: >60 MLS/MIN/1.73/M2
GLOBULIN SER-MCNC: 4.2 GM/DL (ref 2.4–3.5)
GLUCOSE SERPL-MCNC: 101 MG/DL (ref 74–100)
HCT VFR BLD AUTO: 34.8 % (ref 37–47)
HGB BLD-MCNC: 11.3 G/DL (ref 12–16)
IMM GRANULOCYTES # BLD AUTO: 0.03 X10(3)/MCL (ref 0–0.04)
IMM GRANULOCYTES NFR BLD AUTO: 0.6 %
LYMPHOCYTES # BLD AUTO: 1.4 X10(3)/MCL (ref 0.6–4.6)
LYMPHOCYTES NFR BLD AUTO: 26.5 %
MAGNESIUM SERPL-MCNC: 2.1 MG/DL (ref 1.6–2.6)
MCH RBC QN AUTO: 29.6 PG (ref 27–31)
MCHC RBC AUTO-ENTMCNC: 32.5 G/DL (ref 33–36)
MCV RBC AUTO: 91.1 FL (ref 80–94)
MONOCYTES # BLD AUTO: 0.53 X10(3)/MCL (ref 0.1–1.3)
MONOCYTES NFR BLD AUTO: 10 %
NEUTROPHILS # BLD AUTO: 3.08 X10(3)/MCL (ref 2.1–9.2)
NEUTROPHILS NFR BLD AUTO: 58.3 %
PHOSPHATE SERPL-MCNC: 2.1 MG/DL (ref 2.3–4.7)
PLATELET # BLD AUTO: 363 X10(3)/MCL (ref 130–400)
PMV BLD AUTO: 8.8 FL (ref 7.4–10.4)
POTASSIUM SERPL-SCNC: 4 MMOL/L (ref 3.5–5.1)
PROT SERPL-MCNC: 7.9 GM/DL (ref 6.4–8.3)
RBC # BLD AUTO: 3.82 X10(6)/MCL (ref 4.2–5.4)
SODIUM SERPL-SCNC: 140 MMOL/L (ref 136–145)
WBC # SPEC AUTO: 5.28 X10(3)/MCL (ref 4.5–11.5)

## 2023-07-21 PROCEDURE — 84100 ASSAY OF PHOSPHORUS: CPT | Performed by: FAMILY MEDICINE

## 2023-07-21 PROCEDURE — 85025 COMPLETE CBC W/AUTO DIFF WBC: CPT | Performed by: FAMILY MEDICINE

## 2023-07-21 PROCEDURE — 11000001 HC ACUTE MED/SURG PRIVATE ROOM

## 2023-07-21 PROCEDURE — 83735 ASSAY OF MAGNESIUM: CPT | Performed by: FAMILY MEDICINE

## 2023-07-21 PROCEDURE — 80053 COMPREHEN METABOLIC PANEL: CPT | Performed by: FAMILY MEDICINE

## 2023-07-21 PROCEDURE — 25000003 PHARM REV CODE 250: Performed by: FAMILY MEDICINE

## 2023-07-21 RX ORDER — MUPIROCIN 20 MG/G
OINTMENT TOPICAL 3 TIMES DAILY
Status: DISCONTINUED | OUTPATIENT
Start: 2023-07-21 | End: 2023-07-24 | Stop reason: HOSPADM

## 2023-07-21 RX ORDER — HYDROCODONE BITARTRATE AND ACETAMINOPHEN 7.5; 325 MG/1; MG/1
1 TABLET ORAL EVERY 6 HOURS PRN
Status: DISCONTINUED | OUTPATIENT
Start: 2023-07-21 | End: 2023-07-24 | Stop reason: HOSPADM

## 2023-07-21 RX ORDER — NALOXONE HCL 0.4 MG/ML
0.02 VIAL (ML) INJECTION
Status: DISCONTINUED | OUTPATIENT
Start: 2023-07-21 | End: 2023-07-24 | Stop reason: HOSPADM

## 2023-07-21 RX ORDER — IBUPROFEN 200 MG
16 TABLET ORAL
Status: DISCONTINUED | OUTPATIENT
Start: 2023-07-21 | End: 2023-07-24 | Stop reason: HOSPADM

## 2023-07-21 RX ORDER — KETOROLAC TROMETHAMINE 30 MG/ML
15 INJECTION, SOLUTION INTRAMUSCULAR; INTRAVENOUS EVERY 6 HOURS PRN
Status: DISPENSED | OUTPATIENT
Start: 2023-07-21 | End: 2023-07-24

## 2023-07-21 RX ORDER — ONDANSETRON 2 MG/ML
4 INJECTION INTRAMUSCULAR; INTRAVENOUS EVERY 4 HOURS PRN
Status: DISCONTINUED | OUTPATIENT
Start: 2023-07-21 | End: 2023-07-24 | Stop reason: HOSPADM

## 2023-07-21 RX ORDER — LEVOFLOXACIN 5 MG/ML
750 INJECTION, SOLUTION INTRAVENOUS
Status: DISCONTINUED | OUTPATIENT
Start: 2023-07-21 | End: 2023-07-24 | Stop reason: HOSPADM

## 2023-07-21 RX ORDER — NEOMYCIN SULFATE, POLYMYXIN B SULFATE AND HYDROCORTISONE 10; 3.5; 1 MG/ML; MG/ML; [USP'U]/ML
3 SUSPENSION/ DROPS AURICULAR (OTIC) EVERY 6 HOURS
Status: DISCONTINUED | OUTPATIENT
Start: 2023-07-21 | End: 2023-07-24

## 2023-07-21 RX ORDER — IBUPROFEN 200 MG
24 TABLET ORAL
Status: DISCONTINUED | OUTPATIENT
Start: 2023-07-21 | End: 2023-07-24 | Stop reason: HOSPADM

## 2023-07-21 RX ORDER — GLUCAGON 1 MG
1 KIT INJECTION
Status: DISCONTINUED | OUTPATIENT
Start: 2023-07-21 | End: 2023-07-24 | Stop reason: HOSPADM

## 2023-07-21 RX ORDER — ACETAMINOPHEN 325 MG/1
650 TABLET ORAL EVERY 8 HOURS PRN
Status: DISCONTINUED | OUTPATIENT
Start: 2023-07-21 | End: 2023-07-24 | Stop reason: HOSPADM

## 2023-07-21 RX ORDER — SODIUM CHLORIDE 0.9 % (FLUSH) 0.9 %
10 SYRINGE (ML) INJECTION EVERY 12 HOURS PRN
Status: DISCONTINUED | OUTPATIENT
Start: 2023-07-21 | End: 2023-07-24 | Stop reason: HOSPADM

## 2023-07-21 RX ORDER — ONDANSETRON 4 MG/1
4 TABLET, ORALLY DISINTEGRATING ORAL EVERY 6 HOURS PRN
Status: DISCONTINUED | OUTPATIENT
Start: 2023-07-21 | End: 2023-07-24 | Stop reason: HOSPADM

## 2023-07-21 RX ADMIN — HYDROCODONE BITARTRATE AND ACETAMINOPHEN 1 TABLET: 7.5; 325 TABLET ORAL at 05:07

## 2023-07-21 RX ADMIN — MUPIROCIN 1 G: 20 OINTMENT TOPICAL at 09:07

## 2023-07-21 RX ADMIN — ONDANSETRON 4 MG: 4 TABLET, ORALLY DISINTEGRATING ORAL at 05:07

## 2023-07-21 RX ADMIN — NEOMYCIN SULFATE, POLYMYXIN B SULFATE AND HYDROCORTISONE 3 DROP: 10; 3.5; 1 SUSPENSION/ DROPS AURICULAR (OTIC) at 05:07

## 2023-07-21 NOTE — PLAN OF CARE
Problem: Adult Inpatient Plan of Care  Goal: Plan of Care Review  Outcome: Ongoing, Progressing  Flowsheets (Taken 7/21/2023 1748)  Plan of Care Reviewed With:   patient   daughter  Goal: Patient-Specific Goal (Individualized)  Outcome: Ongoing, Progressing  Goal: Absence of Hospital-Acquired Illness or Injury  Outcome: Ongoing, Progressing  Intervention: Identify and Manage Fall Risk  Flowsheets (Taken 7/21/2023 1748)  Safety Promotion/Fall Prevention:   nonskid shoes/socks when out of bed   lighting adjusted  Intervention: Prevent Skin Injury  Flowsheets (Taken 7/21/2023 1748)  Body Position: position changed independently  Skin Protection: adhesive use limited  Intervention: Prevent and Manage VTE (Venous Thromboembolism) Risk  Flowsheets (Taken 7/21/2023 1748)  Activity Management: Ambulated -L4  VTE Prevention/Management: ROM (active) performed  Range of Motion: active ROM (range of motion) encouraged  Intervention: Prevent Infection  Flowsheets (Taken 7/21/2023 1748)  Infection Prevention:   environmental surveillance performed   equipment surfaces disinfected  Goal: Optimal Comfort and Wellbeing  Outcome: Ongoing, Progressing  Intervention: Monitor Pain and Promote Comfort  Flowsheets (Taken 7/21/2023 1748)  Pain Management Interventions:   medication offered   position adjusted   relaxation techniques promoted  Intervention: Provide Person-Centered Care  Flowsheets (Taken 7/21/2023 1748)  Trust Relationship/Rapport:   care explained   choices provided   emotional support provided  Goal: Readiness for Transition of Care  Outcome: Ongoing, Progressing  Intervention: Mutually Develop Transition Plan  Flowsheets (Taken 7/21/2023 1748)  Equipment Currently Used at Home: none  Transportation Anticipated: family or friend will provide  Communicated NADINE with patient/caregiver: Yes  Do you expect to return to your current living situation?: Yes  Do you have help at home or someone to help you manage your care at  home?: Yes  Readmission within 30 days?: No  Do you currently have service(s) that help you manage your care at home?: No  Is the pt/caregiver preference to resume services with current agency: No     Problem: Pain Acute  Goal: Acceptable Pain Control and Functional Ability  Outcome: Ongoing, Progressing  Intervention: Develop Pain Management Plan  Flowsheets (Taken 7/21/2023 1748)  Pain Management Interventions:   medication offered   position adjusted   relaxation techniques promoted  Intervention: Prevent or Manage Pain  Flowsheets (Taken 7/21/2023 1748)  Sensory Stimulation Regulation:   care clustered   quiet environment promoted  Bowel Elimination Promotion: ambulation promoted  Medication Review/Management: medications reviewed  Intervention: Optimize Psychosocial Wellbeing  Flowsheets (Taken 7/21/2023 1748)  Supportive Measures:   decision-making supported   active listening utilized  Diversional Activities: smartphone     Problem: Hypertension Comorbidity  Goal: Blood Pressure in Desired Range  Outcome: Ongoing, Progressing  Intervention: Maintain Blood Pressure Management  Flowsheets (Taken 7/21/2023 1748)  Medication Review/Management: medications reviewed     Problem: Skin or Soft Tissue Infection  Goal: Absence of Infection Signs and Symptoms  Outcome: Ongoing, Progressing  Intervention: Minimize and Manage Infection Progression  Flowsheets (Taken 7/21/2023 1748)  Infection Prevention:   environmental surveillance performed   equipment surfaces disinfected  Infection Management: aseptic technique maintained

## 2023-07-21 NOTE — ANESTHESIA PREPROCEDURE EVALUATION
07/21/2023  Radha Finch is a 53 y.o., female who presents with cellulitis of the left ear      Pre-op Assessment    I have reviewed the Patient Summary Reports.     I have reviewed the Nursing Notes. I have reviewed the NPO Status.   I have reviewed the Medications.     Review of Systems  Anesthesia Hx:  No problems with previous Anesthesia  Denies Family Hx of Anesthesia complications.   Denies Personal Hx of Anesthesia complications.   Hematology/Oncology:  Hematology Normal   Oncology Normal     EENT/Dental:EENT/Dental Normal   Cardiovascular:   Hypertension, well controlled    Pulmonary:  Pulmonary Normal    Renal/:  Renal/ Normal     Hepatic/GI:  Hepatic/GI Normal    Musculoskeletal:  Musculoskeletal Normal    Neurological:  Neurology Normal    Endocrine:  Endocrine Normal    Dermatological:  Skin Normal    Psych:  Psychiatric Normal           Physical Exam  General: Well nourished, Cooperative, Alert and Oriented    Airway:  Mallampati: I   Mouth Opening: Normal  TM Distance: Normal  Tongue: Normal  Neck ROM: Normal ROM    Dental:  Intact    Chest/Lungs:  Clear to auscultation    Heart:  Rate: Normal  Rhythm: Regular Rhythm  Sounds: Normal    Abdomen:  Normal        Anesthesia Plan  Type of Anesthesia, risks & benefits discussed:    Anesthesia Type: Gen Natural Airway  Intra-op Monitoring Plan: Standard ASA Monitors  Post Op Pain Control Plan: multimodal analgesia  Induction:  IV  Informed Consent: Informed consent signed with the Patient and all parties understand the risks and agree with anesthesia plan.  All questions answered. Patient consented to blood products? Yes  ASA Score: 2 Emergent  Day of Surgery Review of History & Physical: H&P Update referred to the surgeon/provider.I have interviewed and examined the patient. I have reviewed the patient's H&P dated: KSA. There are no significant  changes. H&P completed by Anesthesiologist.    Ready For Surgery From Anesthesia Perspective.     .

## 2023-07-22 PROCEDURE — 63600175 PHARM REV CODE 636 W HCPCS: Performed by: FAMILY MEDICINE

## 2023-07-22 PROCEDURE — 88304 TISSUE EXAM BY PATHOLOGIST: CPT | Performed by: SURGERY

## 2023-07-22 PROCEDURE — 87070 CULTURE OTHR SPECIMN AEROBIC: CPT | Performed by: SURGERY

## 2023-07-22 PROCEDURE — 63600175 PHARM REV CODE 636 W HCPCS

## 2023-07-22 PROCEDURE — C1751 CATH, INF, PER/CENT/MIDLINE: HCPCS

## 2023-07-22 PROCEDURE — 36000704 HC OR TIME LEV I 1ST 15 MIN: Performed by: SURGERY

## 2023-07-22 PROCEDURE — 99900035 HC TECH TIME PER 15 MIN (STAT)

## 2023-07-22 PROCEDURE — 87075 CULTR BACTERIA EXCEPT BLOOD: CPT | Performed by: SURGERY

## 2023-07-22 PROCEDURE — 94761 N-INVAS EAR/PLS OXIMETRY MLT: CPT

## 2023-07-22 PROCEDURE — 37000008 HC ANESTHESIA 1ST 15 MINUTES: Performed by: SURGERY

## 2023-07-22 PROCEDURE — 36000705 HC OR TIME LEV I EA ADD 15 MIN: Performed by: SURGERY

## 2023-07-22 PROCEDURE — 11000001 HC ACUTE MED/SURG PRIVATE ROOM

## 2023-07-22 PROCEDURE — 25000003 PHARM REV CODE 250: Performed by: FAMILY MEDICINE

## 2023-07-22 PROCEDURE — 63600175 PHARM REV CODE 636 W HCPCS: Performed by: NURSE ANESTHETIST, CERTIFIED REGISTERED

## 2023-07-22 PROCEDURE — 25000003 PHARM REV CODE 250: Performed by: NURSE ANESTHETIST, CERTIFIED REGISTERED

## 2023-07-22 PROCEDURE — D9220A PRA ANESTHESIA: ICD-10-PCS | Mod: ,,, | Performed by: NURSE ANESTHETIST, CERTIFIED REGISTERED

## 2023-07-22 PROCEDURE — 36569 INSJ PICC 5 YR+ W/O IMAGING: CPT

## 2023-07-22 PROCEDURE — 87102 FUNGUS ISOLATION CULTURE: CPT | Performed by: SURGERY

## 2023-07-22 PROCEDURE — 37000009 HC ANESTHESIA EA ADD 15 MINS: Performed by: SURGERY

## 2023-07-22 PROCEDURE — 87205 SMEAR GRAM STAIN: CPT | Performed by: SURGERY

## 2023-07-22 PROCEDURE — A4216 STERILE WATER/SALINE, 10 ML: HCPCS | Performed by: FAMILY MEDICINE

## 2023-07-22 PROCEDURE — D9220A PRA ANESTHESIA: Mod: ,,, | Performed by: NURSE ANESTHETIST, CERTIFIED REGISTERED

## 2023-07-22 RX ORDER — SODIUM CHLORIDE 0.9 % (FLUSH) 0.9 %
10 SYRINGE (ML) INJECTION EVERY 6 HOURS
Status: DISCONTINUED | OUTPATIENT
Start: 2023-07-22 | End: 2023-07-24 | Stop reason: HOSPADM

## 2023-07-22 RX ORDER — MORPHINE SULFATE 10 MG/ML
INJECTION INTRAMUSCULAR; INTRAVENOUS; SUBCUTANEOUS
Status: DISCONTINUED | OUTPATIENT
Start: 2023-07-22 | End: 2023-07-22

## 2023-07-22 RX ORDER — PROPOFOL 10 MG/ML
VIAL (ML) INTRAVENOUS
Status: DISCONTINUED | OUTPATIENT
Start: 2023-07-22 | End: 2023-07-22

## 2023-07-22 RX ORDER — MIDAZOLAM HYDROCHLORIDE 1 MG/ML
INJECTION INTRAMUSCULAR; INTRAVENOUS
Status: DISCONTINUED | OUTPATIENT
Start: 2023-07-22 | End: 2023-07-22

## 2023-07-22 RX ORDER — METOCLOPRAMIDE HYDROCHLORIDE 5 MG/ML
INJECTION INTRAMUSCULAR; INTRAVENOUS
Status: DISCONTINUED | OUTPATIENT
Start: 2023-07-22 | End: 2023-07-22

## 2023-07-22 RX ORDER — BUPIVACAINE HYDROCHLORIDE 5 MG/ML
INJECTION, SOLUTION EPIDURAL; INTRACAUDAL
Status: DISCONTINUED | OUTPATIENT
Start: 2023-07-22 | End: 2023-07-22

## 2023-07-22 RX ORDER — LIDOCAINE HYDROCHLORIDE 20 MG/ML
INJECTION INTRAVENOUS
Status: DISCONTINUED | OUTPATIENT
Start: 2023-07-22 | End: 2023-07-22

## 2023-07-22 RX ORDER — FENTANYL CITRATE 50 UG/ML
INJECTION, SOLUTION INTRAMUSCULAR; INTRAVENOUS
Status: DISCONTINUED | OUTPATIENT
Start: 2023-07-22 | End: 2023-07-22

## 2023-07-22 RX ORDER — SODIUM CHLORIDE 0.9 % (FLUSH) 0.9 %
10 SYRINGE (ML) INJECTION
Status: DISCONTINUED | OUTPATIENT
Start: 2023-07-22 | End: 2023-07-24 | Stop reason: HOSPADM

## 2023-07-22 RX ADMIN — PROPOFOL 30 MG: 10 INJECTION, EMULSION INTRAVENOUS at 03:07

## 2023-07-22 RX ADMIN — PROPOFOL 50 MG: 10 INJECTION, EMULSION INTRAVENOUS at 04:07

## 2023-07-22 RX ADMIN — FENTANYL CITRATE 100 MCG: 50 INJECTION, SOLUTION INTRAMUSCULAR; INTRAVENOUS at 03:07

## 2023-07-22 RX ADMIN — KETOROLAC TROMETHAMINE 15 MG: 30 INJECTION, SOLUTION INTRAMUSCULAR; INTRAVENOUS at 06:07

## 2023-07-22 RX ADMIN — PROPOFOL 50 MG: 10 INJECTION, EMULSION INTRAVENOUS at 03:07

## 2023-07-22 RX ADMIN — BUPIVACAINE HYDROCHLORIDE 3 MG: 5 INJECTION, SOLUTION EPIDURAL; INTRACAUDAL; PERINEURAL at 03:07

## 2023-07-22 RX ADMIN — SODIUM CHLORIDE, PRESERVATIVE FREE 10 ML: 5 INJECTION INTRAVENOUS at 06:07

## 2023-07-22 RX ADMIN — VANCOMYCIN HYDROCHLORIDE 1000 MG: 1 INJECTION, POWDER, LYOPHILIZED, FOR SOLUTION INTRAVENOUS at 06:07

## 2023-07-22 RX ADMIN — VANCOMYCIN HYDROCHLORIDE 1000 MG: 1 INJECTION, POWDER, LYOPHILIZED, FOR SOLUTION INTRAVENOUS at 02:07

## 2023-07-22 RX ADMIN — NEOMYCIN SULFATE, POLYMYXIN B SULFATE AND HYDROCORTISONE 3 DROP: 10; 3.5; 1 SUSPENSION/ DROPS AURICULAR (OTIC) at 12:07

## 2023-07-22 RX ADMIN — MORPHINE SULFATE 4 MG: 10 INJECTION INTRAVENOUS at 04:07

## 2023-07-22 RX ADMIN — MUPIROCIN 1 G: 20 OINTMENT TOPICAL at 09:07

## 2023-07-22 RX ADMIN — SODIUM CHLORIDE, POTASSIUM CHLORIDE, SODIUM LACTATE AND CALCIUM CHLORIDE: 600; 310; 30; 20 INJECTION, SOLUTION INTRAVENOUS at 03:07

## 2023-07-22 RX ADMIN — NEOMYCIN SULFATE, POLYMYXIN B SULFATE AND HYDROCORTISONE 3 DROP: 10; 3.5; 1 SUSPENSION/ DROPS AURICULAR (OTIC) at 01:07

## 2023-07-22 RX ADMIN — MIDAZOLAM HYDROCHLORIDE 2 MG: 1 INJECTION INTRAMUSCULAR; INTRAVENOUS at 03:07

## 2023-07-22 RX ADMIN — NEOMYCIN SULFATE, POLYMYXIN B SULFATE AND HYDROCORTISONE 3 DROP: 10; 3.5; 1 SUSPENSION/ DROPS AURICULAR (OTIC) at 06:07

## 2023-07-22 RX ADMIN — MUPIROCIN: 20 OINTMENT TOPICAL at 09:07

## 2023-07-22 RX ADMIN — METOCLOPRAMIDE 10 MG: 5 INJECTION, SOLUTION INTRAMUSCULAR; INTRAVENOUS at 03:07

## 2023-07-22 RX ADMIN — LEVOFLOXACIN 750 MG: 750 INJECTION, SOLUTION INTRAVENOUS at 04:07

## 2023-07-22 RX ADMIN — LIDOCAINE HYDROCHLORIDE 100 MG: 20 INJECTION, SOLUTION INTRAVENOUS at 03:07

## 2023-07-22 NOTE — OP NOTE
[facility] - Medical Surgical Unit  Operative Note      Date of Procedure: 7/22/2023     Procedure: Procedure(s) (LRB):  INCISION AND DRAINAGE, ABSCESS (Left)     Surgeon(s) and Role:     * Hal David MD - Primary    Assisting Surgeon: None    Pre-Operative Diagnosis: Cellulitis, unspecified cellulitis site [L03.90]  Abscess in the superior preauricular region  Post-Operative Diagnosis: Post-Op Diagnosis Codes:     * Cellulitis, unspecified cellulitis site [L03.90]  Abscess in the superior preauricular region incise and drain cultured aerobic anaerobic Gram stain  Anesthesia: General    Operative Findings (including complications, if any): Patient is a 53-year-old  female with  pain and swelling/cellulitis of her left periauricular area.  She has been both to the walk-in clinic where they attempted to drain it with a   needle and also to the emergency room.  She has been on antibiotics and also   Bactroban cream.  It has continued to hurt and she is being admitted for   worsening cellulitis.  Suspected abscess formation as well patient failed outpatient antibiotics.  Patient needed IV antibiotics for completion of treatment and care.  White count on admission was 5.28 with a hemoglobin 11 hematocrit 34 platelet count was 368.  CT scan shows a left temporal scalp facial preauricular soft tissue swelling and cellulitis with a drainable fluid collection or abscess     Patient was brought to the operative supine position, local and IV sedation were used , prepped and draped in a sterile fashion.  Patient had incision drainage of a superior periauricular abscess with washout , curetting of the wound .  Silver nitrate was applied cultures were obtained aerobic anaerobic Gram stain.  Wound was packed with saline wet-to-dry gauze.  The area of incision drainage was 3 cm long about 1 or 2 cm wide    Plan  1. IV antibiotics  2. Dressing changes saline wet-to-dry gauze with showering on a  twice a day basis washing the wound with soap and water  3.  Follow up with me in the office further treatment and care       Description of Technical Procedures:  Noted above       Significant Surgical Tasks Conducted by the Assistant(s), if Applicable:  None       Estimated Blood Loss (EBL): 3 mL           Implants: * No implants in log *    Specimens:   Specimen (24h ago, onward)       Start     Ordered    07/22/23 1604  Specimen to Pathology  RELEASE UPON ORDERING        References:    Click here for ordering Quick Tip   Question:  Release to patient  Answer:  Immediate    07/22/23 1604                            Condition: Good    Disposition: PACU - hemodynamically stable.    Attestation: I was present and scrubbed for the entire procedure.    Discharge Note    OUTCOME: Patient tolerated treatment/procedure well without complication and is now ready for discharge.      DISPOSITION: Home or Self Care    FINAL DIAGNOSIS:  Cellulitis of auricle of left ear left ear abscess status post drainage with washout          FOLLOWUP: In clinic 1 week    DISCHARGE INSTRUCTIONS:  No discharge procedures on file.

## 2023-07-22 NOTE — ANESTHESIA POSTPROCEDURE EVALUATION
Anesthesia Post Evaluation    Patient: Radha Finch    Procedure(s) Performed: Procedure(s) (LRB):  INCISION AND DRAINAGE, ABSCESS (Left)    Final Anesthesia Type: general      Patient location during evaluation: med/surg floor  Patient participation: Yes- Able to Participate  Level of consciousness: awake  Post-procedure vital signs: reviewed and stable  Pain management: adequate  Airway patency: patent  JANEL mitigation strategies: Multimodal analgesia  PONV status at discharge: No PONV  Anesthetic complications: no      Cardiovascular status: hemodynamically stable  Respiratory status: unassisted, room air and spontaneous ventilation  Hydration status: euvolemic  Follow-up not needed.          Vitals Taken Time   BP 07/22/23 1616   Temp 07/22/23 1616   Pulse 07/22/23 1616   Resp 07/22/23 1616   SpO2 07/22/23 1616         No case tracking events are documented in the log.      Pain/Letha Score: Pain Rating Prior to Med Admin: 9 (7/21/2023  5:59 PM)

## 2023-07-22 NOTE — PROGRESS NOTES
Pharmacokinetic Initial Assessment: IV Vancomycin    Assessment/Plan:    Initiate intravenous vancomycin followed by a maintenance dose of vancomycin 1000mg IV every 16 hours  Desired empiric serum trough concentration is 15 to 20 mcg/mL  Draw vancomycin trough level 60 min prior to fourth dose on 7/24 at approximately 0200  Pharmacy will continue to follow and monitor vancomycin.      Please contact pharmacy at extension [phone] with any questions regarding this assessment.     Thank you for the consult,   Lakeshia Leyva       Patient brief summary:  Radha Finch is a 53 y.o. female initiated on antimicrobial therapy with IV Vancomycin for treatment of suspected skin & soft tissue infection    Drug Allergies:   Review of patient's allergies indicates:   Allergen Reactions    Penicillins Shortness Of Breath     Other reaction(s): sob       Actual Body Weight:   78.5kg    Renal Function:   Estimated Creatinine Clearance: 65.7 mL/min (based on SCr of 0.95 mg/dL).,     Dialysis Method (if applicable):  N/A    CBC (last 72 hours):  Recent Labs   Lab Result Units 07/21/23  1739   WBC x10(3)/mcL 5.28   Hgb g/dL 11.3*   Hct % 34.8*   Platelet x10(3)/mcL 363   Mono % % 10.0   Eos % % 4.0   Basophil % % 0.6       Metabolic Panel (last 72 hours):  Recent Labs   Lab Result Units 07/21/23  1739   Sodium Level mmol/L 140   Potassium Level mmol/L 4.0   Chloride mmol/L 106   Carbon Dioxide mmol/L 25   Glucose Level mg/dL 101*   Blood Urea Nitrogen mg/dL 12.0   Creatinine mg/dL 0.95   Albumin Level g/dL 3.7   Bilirubin Total mg/dL 0.3   Alkaline Phosphatase unit/L 138   Aspartate Aminotransferase unit/L 17   Alanine Aminotransferase unit/L 12   Magnesium Level mg/dL 2.10   Phosphorus Level mg/dL 2.1*       Drug levels (last 3 results):  No results for input(s): VANCOMYCINRA, VANCORANDOM, VANCOMYCINPE, VANCOPEAK, VANCOMYCINTR, VANCOTROUGH in the last 72 hours.    Microbiologic Results:  Microbiology Results (last 7 days)       ** No  results found for the last 168 hours. **

## 2023-07-22 NOTE — CONSULTS
Patient is a 53-year-old  female with  pain and swelling/cellulitis of her left periauricular area.  She has been both to the walk-in clinic where they attempted to drain it with a   needle and also to the emergency room.  She has been on antibiotics and also   Bactroban cream.  It has continued to hurt and she is being admitted for   worsening cellulitis.  Suspected abscess formation as well patient failed outpatient antibiotics.  Patient needed IV antibiotics for completion of treatment and care.  White count on admission was 5.28 with a hemoglobin 11 hematocrit 34 platelet count was 368.  CT scan shows a left temporal scalp facial preauricular soft tissue swelling and cellulitis with a drainable fluid collection or abscess I was consulted for the purpose of draining the abscess.    Review of patients allergies indicates:  Allergen  Penicillin reaction is shortness of breath(Dyspnea)      Past Medical History  Diagnosis  Prediabetic A1c is 5.7  Insomnia  History of Hang-Parkinson-White syndrome  Sciatica  5.   Left periauricular area swelling /cellulitus    Past surgical History  Procedure  C -Section X 2  Colonoscopy   Hysterectomy   Pilonidal cysts removed  No EGD, Angiogram, Stress Test or Echocardiogram    Immunizations  Covid 19 vaccines 2022, 10/05/2021, 05/10/2021, 2021  No Hepatitis vaccine  Shingles vaccine in   Pneumonia vaccine in   Flu vaccine in   No DTAP    Family History  Father  in  from hypertension and stroke.  Mother  in  from blood clots and hypertension  Cousin  from cancer  Uncle  from Diabetes   Grandfather and Grandmother both  from Diabetes  Aunt was diagnosed with Breast Ca but remains alive    Social History  Non smoker  Alcohol on holidays, frozen  drink  No Drugs  No  Service  No FDC time  No Rehab    X1,  X 1,   Abo, 2 Girls, 1 CNA and 1 Ortha Assistant  Level of Education  "is 10th grade  Employed as [employer] Manager at [employer]  Resides in [place]  PCP is TIARA Garcia    Review of Systems  Patient is positive for ear pain and swelling of ear and jaw on left side of face.  Patient is in distress secondary to ear pain and swelling    Objective  Vital Signs    07/22/2023   BP is 103/63  Pulse is 62  SpO2 is 99%  Temp is 98.1 F(36.7 C)  Weight is 78.5kg(173 lbs 1 oz)  Height is 5' 2"(157.5 cm)  Body Mass index is 31.65 kg/m2      Physical Exam  The patient is in distress secondary to her significant ear pain. She does have some erythema of the external ear canal on the left and she has a significant amount of swelling with rubor and pain as well as erythema of the periauricular area at the superior, anterior part of her ear, possible infecting the cartilage as well.   Oropharynx is clear  Rate and rhythm is regular  Lungs are clear to auscultation bilaterally  Abdomen is non tender and non distended  No significant signs of edema to the extremities  No focal deficits    Laboratory findings  CBC    07/21/2023    1739  5.28>         11.3 / 34.8     < 363   91.1 /29.6      CMP   07/21/2023  1739    140/4.0     106 /  25     12.0  /  0.95      < 101    10.0 /2.10          Radiology Findings  CT Maxillofacial Without Contrast  Impression  Left temporal scalp and facial pre-auricular soft tissue swelling and cellulitis without a drainable fluid collection or abscess.    Xray Chest 1 view for Line/Tube Placement  Impression  No acute cardiopulmonary process.  Left-sided intra PICC line tip projects over the distal SVC.    Assessment  Patient is a 53-year-old  female with  pain and swelling/cellulitis of her left periauricular area.  She has been both to the walk-in clinic where they attempted to drain it with a   needle and also to the emergency room.  She has been on antibiotics and also   Bactroban cream.  It has continued to hurt and she is being admitted for "   worsening cellulitis.  Suspected abscess formation as well patient failed outpatient antibiotics.  Patient needed IV antibiotics for completion of treatment and care.  White count on admission was 5.28 with a hemoglobin 11 hematocrit 34 platelet count was 368.  CT scan shows a left temporal scalp facial preauricular soft tissue swelling and cellulitis with a drainable fluid collection or abscess I was consulted for the purpose of draining the abscess.    Plan   Start IV Fluids  Start IV Antiobiotics  Incision drainage periauricular abscess about 3-4 cm in size

## 2023-07-22 NOTE — PLAN OF CARE
Problem: Adult Inpatient Plan of Care  Goal: Plan of Care Review  Outcome: Ongoing, Progressing  Goal: Patient-Specific Goal (Individualized)  Outcome: Ongoing, Progressing  Goal: Absence of Hospital-Acquired Illness or Injury  Outcome: Ongoing, Progressing  Goal: Optimal Comfort and Wellbeing  Outcome: Ongoing, Progressing  Goal: Readiness for Transition of Care  Outcome: Ongoing, Progressing     Problem: Pain Acute  Goal: Acceptable Pain Control and Functional Ability  Outcome: Ongoing, Progressing     Problem: Hypertension Comorbidity  Goal: Blood Pressure in Desired Range  Outcome: Ongoing, Progressing     Problem: Skin or Soft Tissue Infection  Goal: Absence of Infection Signs and Symptoms  Outcome: Ongoing, Progressing     Problem: Infection  Goal: Absence of Infection Signs and Symptoms  Outcome: Ongoing, Progressing

## 2023-07-22 NOTE — PROCEDURES
"Radha Finch is a 53 y.o. female patient.    Temp: 98.1 °F (36.7 °C) (07/22/23 0020)  Pulse: 62 (07/22/23 0020)  Resp: 20 (07/21/23 1759)  BP: 103/63 (07/22/23 0020)  SpO2: 99 % (07/22/23 0020)  Weight: 78.5 kg (173 lb 1 oz) (07/21/23 1736)  Height: 5' 2" (157.5 cm) (07/21/23 1736)    PICC  Date/Time: 7/22/2023 1:07 AM  Consent Done: Yes  Time out: Immediately prior to procedure a time out was called to verify the correct patient, procedure, equipment, support staff and site/side marked as required  Indications: vascular access and med administration  Anesthesia: local infiltration  Local anesthetic: lidocaine 1% without epinephrine  Anesthetic Total (mL): 4  Preparation: skin prepped with ChloraPrep  Skin prep agent dried: skin prep agent completely dried prior to procedure  Sterile barriers: all five maximum sterile barriers used - cap, mask, sterile gown, sterile gloves, and large sterile sheet  Hand hygiene: hand hygiene performed prior to central venous catheter insertion  Location details: left basilic  Catheter type: double lumen  Catheter size: 5 Fr  Catheter Length: 40cm    Ultrasound guidance: yes  Vessel Caliber: medium and patent, compressibility normal  Needle advanced into vessel with real time Ultrasound guidance.  Guidewire confirmed in vessel.  Sterile sheath used.  Number of attempts: 1  Post-procedure: blood return through all ports, chlorhexidine patch and sterile dressing applied    Assessment: placement verified by x-ray        Name Kaylen Naranjo  7/22/2023    "

## 2023-07-22 NOTE — H&P
[facility]                     [id number] [id number]    PATIENT NAME:       PAULINA SOTO  YOB: 1970  CSN:                [id number]   MRN:                [id number]  ADMIT DATE:         2023 16:51:00  PHYSICIAN:          Jeyson Cornell MD                        HISTORY AND PHYSICAL      HISTORY OF PRESENT ILLNESS:  The patient usually who is out of town.  The   patient has had pain and swelling/cellulitis of her left periauricular area.    She has been both to the walk-in clinic where they attempted to drain it with a   needle and also to the emergency room.  She has been on antibiotics and also   Bactroban cream.  It has continued to hurt and she is being admitted for   worsening cellulitis.  Suspected abscess formation as well.    PAST MEDICAL HISTORY:  Significant for prediabetes, seasonal allergies,   insomnia, history of Hang-Parkinson-White syndrome, sciatica.  She is    2, para 2.    PAST SURGICAL HISTORY:  Include  x2, colonoscopy, hysterectomy.  She   also relates she had pilonidal cysts in the past requiring surgery.    FAMILY MEDICAL HISTORY:  Father had hypertension and stroke.  Mother had blood   clots and was diagnosed with hypertension.  She had an aunt with breast cancer   and diabetes.  A cousin with some sort of cancer.  Uncle with diabetes.    Grandfather with diabetes.  Grandmother with diabetes.    SOCIAL HISTORY:  She is a nonsmoker.  No alcohol abuse.  No drugs.    ALLERGIES:  INCLUDE PENICILLIN, WHICH CAUSES HER TO HAVE SHORTNESS OF BREATH.     CURRENT MEDICATIONS:  Include;  1. Norco 7.5 mg 1 q.6 hours for her ear pain.  2. She had Bactroban.  3. She has been taking Bactrim DS.  4. Also clindamycin has been prescribed as well as ibuprofen 800 mg q.8 hours.  5. She takes albuterol.  6. She has had a Medrol Dosepak in the past.  7. Singulair 10 mg 1 p.o.  daily.  8. Hydrochlorothiazide 12.5 mg 1 p.o. daily.  9. Cyclobenzaprine 10 mg 1 at bedtime as needed for muscle spasms.  10. Lipitor 10 mg 1 p.o. daily.  11. Zyrtec 10 mg 1 p.o. daily.  12. Trazodone 100 mg at bedtime.  13. Estradiol 1 mg 1 p.o. daily.    IMMUNIZATIONS:  Unknown at this time.    CODE STATUS:  Full.    PHYSICAL EXAMINATION:  GENERAL:  The patient is in distress secondary to her significant ear pain.  She   does have some erythema of the external ear canal on the left and she has a   significant amount of swelling with rubor and pain as well as erythema of the   periauricular area at the superior, anterior part of her ear, possibly infecting   the cartilage as well.  HEENT:  Oropharynx is clear.  HEART:  Regular rate and rhythm.    LUNGS:  Clear to auscultation bilaterally.    ABDOMEN:  Nontender, nondistended.  Normoactive bowel sounds.  EXTREMITIES:  No significant edema.  /RECTAL/BREASTS:  Deferred at this time.  NEUROLOGICAL:  No focal deficits.    ASSESSMENT:    1. Periauricular cellulitis, possibly infecting the cartilage failing outpatient   treatment and worsening.  She has underlying abscess formation as well.  2. Underlying medical problems, otherwise as stated above.  3. Penicillin allergy.    PLAN:  Admit the patient to hospital, give IV antibiotics, Toradol pain   medicine, consult Dr. Hiro David for I and D and a CT of her maxillofacial   area.  Baseline labs.    ______________________________  Jeyson Cornell MD    PBS/AQS  DD:  07/22/2023  Time:  10:41AM  DT:  07/22/2023  Time:  11:55AM  Job #:  [id number]      HISTORY AND PHYSICAL

## 2023-07-23 LAB
ALBUMIN SERPL-MCNC: 3.3 G/DL (ref 3.5–5)
ALBUMIN/GLOB SERPL: 0.8 RATIO (ref 1.1–2)
ALP SERPL-CCNC: 133 UNIT/L (ref 40–150)
ALT SERPL-CCNC: 13 UNIT/L (ref 0–55)
AST SERPL-CCNC: 18 UNIT/L (ref 5–34)
BASOPHILS # BLD AUTO: 0.03 X10(3)/MCL
BASOPHILS NFR BLD AUTO: 0.6 %
BILIRUBIN DIRECT+TOT PNL SERPL-MCNC: 0.3 MG/DL
BUN SERPL-MCNC: 12 MG/DL (ref 9.8–20.1)
CALCIUM SERPL-MCNC: 9.5 MG/DL (ref 8.4–10.2)
CHLORIDE SERPL-SCNC: 104 MMOL/L (ref 98–107)
CO2 SERPL-SCNC: 25 MMOL/L (ref 22–29)
CREAT SERPL-MCNC: 0.73 MG/DL (ref 0.55–1.02)
EOSINOPHIL # BLD AUTO: 0.14 X10(3)/MCL (ref 0–0.9)
EOSINOPHIL NFR BLD AUTO: 2.8 %
ERYTHROCYTE [DISTWIDTH] IN BLOOD BY AUTOMATED COUNT: 13.7 % (ref 11.5–17)
GFR SERPLBLD CREATININE-BSD FMLA CKD-EPI: >60 MLS/MIN/1.73/M2
GLOBULIN SER-MCNC: 3.9 GM/DL (ref 2.4–3.5)
GLUCOSE SERPL-MCNC: 112 MG/DL (ref 74–100)
HCT VFR BLD AUTO: 35.5 % (ref 37–47)
HGB BLD-MCNC: 11.5 G/DL (ref 12–16)
IMM GRANULOCYTES # BLD AUTO: 0.03 X10(3)/MCL (ref 0–0.04)
IMM GRANULOCYTES NFR BLD AUTO: 0.6 %
LYMPHOCYTES # BLD AUTO: 0.99 X10(3)/MCL (ref 0.6–4.6)
LYMPHOCYTES NFR BLD AUTO: 19.8 %
MAGNESIUM SERPL-MCNC: 2.1 MG/DL (ref 1.6–2.6)
MCH RBC QN AUTO: 29.3 PG (ref 27–31)
MCHC RBC AUTO-ENTMCNC: 32.4 G/DL (ref 33–36)
MCV RBC AUTO: 90.6 FL (ref 80–94)
MONOCYTES # BLD AUTO: 0.61 X10(3)/MCL (ref 0.1–1.3)
MONOCYTES NFR BLD AUTO: 12.2 %
NEUTROPHILS # BLD AUTO: 3.2 X10(3)/MCL (ref 2.1–9.2)
NEUTROPHILS NFR BLD AUTO: 64 %
PHOSPHATE SERPL-MCNC: 3.2 MG/DL (ref 2.3–4.7)
PLATELET # BLD AUTO: 354 X10(3)/MCL (ref 130–400)
PMV BLD AUTO: 8.8 FL (ref 7.4–10.4)
POTASSIUM SERPL-SCNC: 3.6 MMOL/L (ref 3.5–5.1)
PROT SERPL-MCNC: 7.2 GM/DL (ref 6.4–8.3)
RBC # BLD AUTO: 3.92 X10(6)/MCL (ref 4.2–5.4)
SODIUM SERPL-SCNC: 136 MMOL/L (ref 136–145)
WBC # SPEC AUTO: 5 X10(3)/MCL (ref 4.5–11.5)

## 2023-07-23 PROCEDURE — 83735 ASSAY OF MAGNESIUM: CPT | Performed by: FAMILY MEDICINE

## 2023-07-23 PROCEDURE — 94761 N-INVAS EAR/PLS OXIMETRY MLT: CPT

## 2023-07-23 PROCEDURE — 11000001 HC ACUTE MED/SURG PRIVATE ROOM

## 2023-07-23 PROCEDURE — 84100 ASSAY OF PHOSPHORUS: CPT | Performed by: FAMILY MEDICINE

## 2023-07-23 PROCEDURE — 99900035 HC TECH TIME PER 15 MIN (STAT)

## 2023-07-23 PROCEDURE — 80053 COMPREHEN METABOLIC PANEL: CPT | Performed by: FAMILY MEDICINE

## 2023-07-23 PROCEDURE — 25000003 PHARM REV CODE 250: Performed by: FAMILY MEDICINE

## 2023-07-23 PROCEDURE — 85025 COMPLETE CBC W/AUTO DIFF WBC: CPT | Performed by: FAMILY MEDICINE

## 2023-07-23 PROCEDURE — A4216 STERILE WATER/SALINE, 10 ML: HCPCS | Performed by: FAMILY MEDICINE

## 2023-07-23 PROCEDURE — 94799 UNLISTED PULMONARY SVC/PX: CPT

## 2023-07-23 PROCEDURE — 63600175 PHARM REV CODE 636 W HCPCS: Performed by: FAMILY MEDICINE

## 2023-07-23 RX ADMIN — LEVOFLOXACIN 750 MG: 750 INJECTION, SOLUTION INTRAVENOUS at 03:07

## 2023-07-23 RX ADMIN — SODIUM CHLORIDE, PRESERVATIVE FREE 10 ML: 5 INJECTION INTRAVENOUS at 12:07

## 2023-07-23 RX ADMIN — VANCOMYCIN HYDROCHLORIDE 1000 MG: 1 INJECTION, POWDER, LYOPHILIZED, FOR SOLUTION INTRAVENOUS at 10:07

## 2023-07-23 RX ADMIN — KETOROLAC TROMETHAMINE 15 MG: 30 INJECTION, SOLUTION INTRAMUSCULAR; INTRAVENOUS at 12:07

## 2023-07-23 RX ADMIN — MUPIROCIN: 20 OINTMENT TOPICAL at 10:07

## 2023-07-23 RX ADMIN — SODIUM CHLORIDE, PRESERVATIVE FREE 10 ML: 5 INJECTION INTRAVENOUS at 05:07

## 2023-07-23 RX ADMIN — KETOROLAC TROMETHAMINE 15 MG: 30 INJECTION, SOLUTION INTRAMUSCULAR; INTRAVENOUS at 10:07

## 2023-07-23 RX ADMIN — MUPIROCIN 1 G: 20 OINTMENT TOPICAL at 10:07

## 2023-07-23 RX ADMIN — KETOROLAC TROMETHAMINE 15 MG: 30 INJECTION, SOLUTION INTRAMUSCULAR; INTRAVENOUS at 08:07

## 2023-07-23 RX ADMIN — ONDANSETRON 4 MG: 2 INJECTION INTRAMUSCULAR; INTRAVENOUS at 12:07

## 2023-07-24 VITALS
OXYGEN SATURATION: 100 % | RESPIRATION RATE: 18 BRPM | BODY MASS INDEX: 31.85 KG/M2 | TEMPERATURE: 98 F | HEART RATE: 65 BPM | SYSTOLIC BLOOD PRESSURE: 124 MMHG | HEIGHT: 62 IN | DIASTOLIC BLOOD PRESSURE: 77 MMHG | WEIGHT: 173.06 LBS

## 2023-07-24 LAB
ALBUMIN SERPL-MCNC: 3.4 G/DL (ref 3.5–5)
ALBUMIN/GLOB SERPL: 0.9 RATIO (ref 1.1–2)
ALP SERPL-CCNC: 137 UNIT/L (ref 40–150)
ALT SERPL-CCNC: 14 UNIT/L (ref 0–55)
AST SERPL-CCNC: 17 UNIT/L (ref 5–34)
BASOPHILS # BLD AUTO: 0.03 X10(3)/MCL
BASOPHILS NFR BLD AUTO: 0.6 %
BILIRUBIN DIRECT+TOT PNL SERPL-MCNC: 0.2 MG/DL
BUN SERPL-MCNC: 15 MG/DL (ref 9.8–20.1)
CALCIUM SERPL-MCNC: 9.5 MG/DL (ref 8.4–10.2)
CHLORIDE SERPL-SCNC: 105 MMOL/L (ref 98–107)
CO2 SERPL-SCNC: 25 MMOL/L (ref 22–29)
CREAT SERPL-MCNC: 0.84 MG/DL (ref 0.55–1.02)
EOSINOPHIL # BLD AUTO: 0.25 X10(3)/MCL (ref 0–0.9)
EOSINOPHIL NFR BLD AUTO: 5.2 %
ERYTHROCYTE [DISTWIDTH] IN BLOOD BY AUTOMATED COUNT: 13.8 % (ref 11.5–17)
GFR SERPLBLD CREATININE-BSD FMLA CKD-EPI: >60 MLS/MIN/1.73/M2
GLOBULIN SER-MCNC: 4 GM/DL (ref 2.4–3.5)
GLUCOSE SERPL-MCNC: 102 MG/DL (ref 74–100)
GRAM STN SPEC: NORMAL
HCT VFR BLD AUTO: 36.2 % (ref 37–47)
HGB BLD-MCNC: 11.9 G/DL (ref 12–16)
IMM GRANULOCYTES # BLD AUTO: 0.05 X10(3)/MCL (ref 0–0.04)
IMM GRANULOCYTES NFR BLD AUTO: 1 %
LYMPHOCYTES # BLD AUTO: 1.59 X10(3)/MCL (ref 0.6–4.6)
LYMPHOCYTES NFR BLD AUTO: 32.9 %
MAGNESIUM SERPL-MCNC: 2.1 MG/DL (ref 1.6–2.6)
MCH RBC QN AUTO: 29.7 PG (ref 27–31)
MCHC RBC AUTO-ENTMCNC: 32.9 G/DL (ref 33–36)
MCV RBC AUTO: 90.3 FL (ref 80–94)
MONOCYTES # BLD AUTO: 0.54 X10(3)/MCL (ref 0.1–1.3)
MONOCYTES NFR BLD AUTO: 11.2 %
NEUTROPHILS # BLD AUTO: 2.37 X10(3)/MCL (ref 2.1–9.2)
NEUTROPHILS NFR BLD AUTO: 49.1 %
PHOSPHATE SERPL-MCNC: 3.8 MG/DL (ref 2.3–4.7)
PLATELET # BLD AUTO: 348 X10(3)/MCL (ref 130–400)
PMV BLD AUTO: 8.6 FL (ref 7.4–10.4)
POTASSIUM SERPL-SCNC: 3.9 MMOL/L (ref 3.5–5.1)
PROT SERPL-MCNC: 7.4 GM/DL (ref 6.4–8.3)
RBC # BLD AUTO: 4.01 X10(6)/MCL (ref 4.2–5.4)
SODIUM SERPL-SCNC: 138 MMOL/L (ref 136–145)
VANCOMYCIN TROUGH SERPL-MCNC: 7.8 UG/ML (ref 15–20)
WBC # SPEC AUTO: 4.83 X10(3)/MCL (ref 4.5–11.5)

## 2023-07-24 PROCEDURE — 25000003 PHARM REV CODE 250: Performed by: FAMILY MEDICINE

## 2023-07-24 PROCEDURE — 80202 ASSAY OF VANCOMYCIN: CPT | Performed by: FAMILY MEDICINE

## 2023-07-24 PROCEDURE — 84100 ASSAY OF PHOSPHORUS: CPT | Performed by: FAMILY MEDICINE

## 2023-07-24 PROCEDURE — 99900035 HC TECH TIME PER 15 MIN (STAT)

## 2023-07-24 PROCEDURE — 94761 N-INVAS EAR/PLS OXIMETRY MLT: CPT

## 2023-07-24 PROCEDURE — 85025 COMPLETE CBC W/AUTO DIFF WBC: CPT | Performed by: FAMILY MEDICINE

## 2023-07-24 PROCEDURE — 63600175 PHARM REV CODE 636 W HCPCS: Performed by: FAMILY MEDICINE

## 2023-07-24 PROCEDURE — 94760 N-INVAS EAR/PLS OXIMETRY 1: CPT

## 2023-07-24 PROCEDURE — A4216 STERILE WATER/SALINE, 10 ML: HCPCS | Performed by: FAMILY MEDICINE

## 2023-07-24 PROCEDURE — 83735 ASSAY OF MAGNESIUM: CPT | Performed by: FAMILY MEDICINE

## 2023-07-24 PROCEDURE — 80053 COMPREHEN METABOLIC PANEL: CPT | Performed by: FAMILY MEDICINE

## 2023-07-24 RX ORDER — MUPIROCIN 20 MG/G
1 OINTMENT TOPICAL 2 TIMES DAILY
Status: DISCONTINUED | OUTPATIENT
Start: 2023-07-24 | End: 2023-07-24 | Stop reason: HOSPADM

## 2023-07-24 RX ORDER — HYDROCODONE BITARTRATE AND ACETAMINOPHEN 5; 325 MG/1; MG/1
1 TABLET ORAL EVERY 4 HOURS PRN
Status: DISCONTINUED | OUTPATIENT
Start: 2023-07-24 | End: 2023-07-24 | Stop reason: HOSPADM

## 2023-07-24 RX ORDER — SODIUM CHLORIDE 9 MG/ML
INJECTION, SOLUTION INTRAVENOUS CONTINUOUS
Status: DISCONTINUED | OUTPATIENT
Start: 2023-07-24 | End: 2023-07-24 | Stop reason: HOSPADM

## 2023-07-24 RX ORDER — CLINDAMYCIN HYDROCHLORIDE 300 MG/1
300 CAPSULE ORAL 2 TIMES DAILY
Start: 2023-07-24 | End: 2023-08-16

## 2023-07-24 RX ORDER — HYDROMORPHONE HYDROCHLORIDE 2 MG/ML
0.5 INJECTION, SOLUTION INTRAMUSCULAR; INTRAVENOUS; SUBCUTANEOUS EVERY 6 HOURS PRN
Status: DISCONTINUED | OUTPATIENT
Start: 2023-07-24 | End: 2023-07-24 | Stop reason: HOSPADM

## 2023-07-24 RX ORDER — ONDANSETRON 2 MG/ML
4 INJECTION INTRAMUSCULAR; INTRAVENOUS EVERY 12 HOURS PRN
Status: DISCONTINUED | OUTPATIENT
Start: 2023-07-24 | End: 2023-07-24 | Stop reason: HOSPADM

## 2023-07-24 RX ORDER — ACETAMINOPHEN 325 MG/1
650 TABLET ORAL EVERY 8 HOURS PRN
Refills: 0
Start: 2023-07-24 | End: 2023-11-29

## 2023-07-24 RX ADMIN — KETOROLAC TROMETHAMINE 15 MG: 30 INJECTION, SOLUTION INTRAMUSCULAR; INTRAVENOUS at 03:07

## 2023-07-24 RX ADMIN — SODIUM CHLORIDE, PRESERVATIVE FREE 10 ML: 5 INJECTION INTRAVENOUS at 06:07

## 2023-07-24 RX ADMIN — MUPIROCIN 1 G: 20 OINTMENT TOPICAL at 09:07

## 2023-07-24 RX ADMIN — LEVOFLOXACIN 750 MG: 750 INJECTION, SOLUTION INTRAVENOUS at 04:07

## 2023-07-24 RX ADMIN — SODIUM CHLORIDE, PRESERVATIVE FREE 10 ML: 5 INJECTION INTRAVENOUS at 12:07

## 2023-07-24 RX ADMIN — NEOMYCIN SULFATE, POLYMYXIN B SULFATE AND HYDROCORTISONE 3 DROP: 10; 3.5; 1 SUSPENSION/ DROPS AURICULAR (OTIC) at 12:07

## 2023-07-24 RX ADMIN — NEOMYCIN SULFATE, POLYMYXIN B SULFATE AND HYDROCORTISONE 3 DROP: 10; 3.5; 1 SUSPENSION/ DROPS AURICULAR (OTIC) at 06:07

## 2023-07-24 RX ADMIN — VANCOMYCIN HYDROCHLORIDE 1000 MG: 1 INJECTION, POWDER, LYOPHILIZED, FOR SOLUTION INTRAVENOUS at 02:07

## 2023-07-24 RX ADMIN — MUPIROCIN 1 G: 20 OINTMENT TOPICAL at 03:07

## 2023-07-24 RX ADMIN — VANCOMYCIN HYDROCHLORIDE 1000 MG: 1 INJECTION, POWDER, LYOPHILIZED, FOR SOLUTION INTRAVENOUS at 03:07

## 2023-07-24 NOTE — PLAN OF CARE
07/24/23 1243   Discharge Assessment   Assessment Type Discharge Planning Assessment   Source of Information patient   People in Home alone   Do you expect to return to your current living situation? Yes   Do you have help at home or someone to help you manage your care at home? Yes   Who are your caregiver(s) and their phone number(s)? fly   Prior to hospitilization cognitive status: Alert/Oriented;No Deficits   Current cognitive status: Alert/Oriented;No Deficits   Equipment Currently Used at Home none   Do you currently have service(s) that help you manage your care at home? No   Do you take prescription medications? Yes   Do you have prescription coverage? Yes   Do you have any problems affording any of your prescribed medications? No   Is the patient taking medications as prescribed? yes   Who is going to help you get home at discharge? fly   How do you get to doctors appointments? family or friend will provide   Are you on dialysis? No   Do you take coumadin? No   Discharge Plan A Home with family   Discharge Plan B Home with family   DME Needed Upon Discharge  none   Discharge Plan discussed with: Patient   Transition of Care Barriers None   Physical Activity   On average, how many days per week do you engage in moderate to strenuous exercise (like a brisk walk)? Patient refu   On average, how many minutes do you engage in exercise at this level? Patient refu   Financial Resource Strain   How hard is it for you to pay for the very basics like food, housing, medical care, and heating? Patient refu   Housing Stability   In the last 12 months, was there a time when you were not able to pay the mortgage or rent on time? [place]   In the last 12 months, was there a time when you did not have a steady place to sleep or slept in a shelter (including now)? [place]   Transportation Needs   In the past 12 months, has lack of transportation kept you from medical appointments or from getting medications? Patient refu   In the  past 12 months, has lack of transportation kept you from meetings, work, or from getting things needed for daily living? Patient refu   Food Insecurity   Within the past 12 months, you worried that your food would run out before you got the money to buy more. Patient refu   Within the past 12 months, the food you bought just didn't last and you didn't have money to get more. Patient refu   Stress   Do you feel stress - tense, restless, nervous, or anxious, or unable to sleep at night because your mind is troubled all the time - these days? Patient refu   Social Connections   In a typical week, how many times do you talk on the phone with family, friends, or neighbors? Patient refu   How often do you get together with friends or relatives? Patient refu   How often do you attend Alevism or Orthodox services? Patient refu   Do you belong to any clubs or organizations such as Alevism groups, unions, fraternal or athletic groups, or school groups? Patient refu   How often do you attend meetings of the clubs or organizations you belong to? Patient refu   Are you , , , , never , or living with a partner? Patient refu   Alcohol Use   Q1: How often do you have a drink containing alcohol? Patient refu   Q2: How many drinks containing alcohol do you have on a typical day when you are drinking? Patient refu   Q3: How often do you have six or more drinks on one occasion? Patient refu

## 2023-07-24 NOTE — PROGRESS NOTES
Date of Procedure: 7/22/2023      Procedure: Procedure(s) (LRB):  INCISION AND DRAINAGE, ABSCESS (Left)      Surgeon(s) and Role:     * Hal David MD - Primary     Assisting Surgeon: None     Pre-Operative Diagnosis: Cellulitis, unspecified cellulitis site [L03.90]  Abscess in the superior preauricular region  Post-Operative Diagnosis: Post-Op Diagnosis Codes:     * Cellulitis, unspecified cellulitis site [L03.90]  Abscess in the superior preauricular region incise and drain cultured aerobic anaerobic Gram stain  Anesthesia: General     Operative Findings (including complications, if any): Patient is a 53-year-old  female with  pain and swelling/cellulitis of her left periauricular area.  She has been both to the walk-in clinic where they attempted to drain it with a   needle and also to the emergency room.  She has been on antibiotics and also   Bactroban cream.  It has continued to hurt and she is being admitted for   worsening cellulitis.  Suspected abscess formation as well patient failed outpatient antibiotics.  Patient needed IV antibiotics for completion of treatment and care.  White count on admission was 5.28 with a hemoglobin 11 hematocrit 34 platelet count was 368.  CT scan shows a left temporal scalp facial preauricular soft tissue swelling and cellulitis with a drainable fluid collection or abscess     07/23/2023  Vital signs are stable   Wounds are clean and dry   Patient seems to be progressing appropriately feels much better  No family members in the room at the time my evaluation  Patient will be taught dressing changes  Patient is instructed to shower soap and water clean her ear   Patient was probably be discharged tomorrow after appropriate IV antibiotic therapy has been given   Oral antibiotics will be given on discharge   Wet-to-dry saline dressings twice a day with showering twice a day will be instructed as well

## 2023-07-24 NOTE — PROGRESS NOTES
"Inpatient Nutrition Evaluation    Admit Date: 7/21/2023   Total duration of encounter: 3 days    Nutrition Recommendation/Prescription     Rec'd Heart Healthy Diet as tolerated.   Monitor intake, weight, and labs.     RD available as needed. Thank you.     Nutrition Assessment     Chart Review    Reason Seen: continuous nutrition monitoring    Malnutrition Screening Tool Results   Have you recently lost weight without trying?: No  Have you been eating poorly because of a decreased appetite?: No   MST Score: 0     Diagnosis:  Cellulitis of auricle of left ear.     Relevant Medical History:   HTN.     Nutrition-Related Medications:   None at this time.     Nutrition-Related Labs:  7/24: H/H 11.9/36.2(L); (H); Alb 3.4(L).     Diet Order: Diet Adult Regular  Oral Supplement Order: none  Appetite/Oral Intake: good/50-75% of meals  Factors Affecting Nutritional Intake: none identified  Food/Jew/Cultural Preferences: none reported  Food Allergies: none reported       Wound(s):       Comments    7/27: S/P I&D. Good appetite and intake. No recent weight loss noted/reported on nsg admit screen. Will continue to monitor during stay.     Anthropometrics    Height: 5' 2" (157.5 cm)    Last Weight: 78.5 kg (173 lb 1 oz) (07/21/23 1736) Weight Method: Bed Scale  BMI (Calculated): 31.6  BMI Classification: obese grade I (BMI 30-34.9)     Ideal Body Weight (IBW), Female: 110 lb     % Ideal Body Weight, Female (lb): 157.33 %                             Usual Weight Provided By: EMR weight history    Wt Readings from Last 5 Encounters:   07/21/23 78.5 kg (173 lb 1 oz)   07/18/23 78.5 kg (173 lb)   05/24/21 72.6 kg (160 lb 0.9 oz)     Weight Change(s) Since Admission:  Admit Weight: 78.5 kg (173 lb 1 oz) (07/21/23 1736)      Patient Education    Not applicable.    Monitoring & Evaluation     Dietitian will monitor food and beverage intake, energy intake, weight, weight change, electrolyte/renal panel, glucose/endocrine " profile, and gastrointestinal profile.  Nutrition Risk/Follow-Up: low (follow-up in 5-7 days)  Patients assigned 'low nutrition risk' status do not qualify for a full nutritional assessment but will be monitored and re-evaluated in a 5-7 day time period. Please consult if re-evaluation needed sooner.

## 2023-07-25 LAB — PSYCHE PATHOLOGY RESULT: NORMAL

## 2023-07-26 LAB — BACTERIA SPEC CULT: NORMAL

## 2023-07-27 ENCOUNTER — HOSPITAL ENCOUNTER (OUTPATIENT)
Dept: WOUND CARE | Facility: HOSPITAL | Age: 53
Discharge: HOME | End: 2023-07-27
Attending: NURSE PRACTITIONER
Payer: MEDICAID

## 2023-07-27 DIAGNOSIS — L03.90 CELLULITIS, UNSPECIFIED CELLULITIS SITE: ICD-10-CM

## 2023-07-27 DIAGNOSIS — G89.18 PAIN AT SURGICAL SITE: ICD-10-CM

## 2023-07-27 DIAGNOSIS — T81.49XA POSTOPERATIVE WOUND INFECTION: Primary | ICD-10-CM

## 2023-07-27 DIAGNOSIS — L76.82 PAIN AT SURGICAL INCISION: ICD-10-CM

## 2023-07-27 PROCEDURE — 97597 DBRDMT OPN WND 1ST 20 CM/<: CPT

## 2023-07-27 PROCEDURE — 99213 OFFICE O/P EST LOW 20 MIN: CPT | Mod: 25

## 2023-07-27 PROCEDURE — 27000999 HC MEDICAL RECORD PHOTO DOCUMENTATION

## 2023-07-27 RX ORDER — ATORVASTATIN CALCIUM 10 MG/1
TABLET, FILM COATED ORAL
COMMUNITY
Start: 2022-06-09

## 2023-07-27 RX ORDER — MUPIROCIN 20 MG/G
OINTMENT TOPICAL 2 TIMES DAILY
Qty: 30 G | Refills: 1 | Status: SHIPPED | OUTPATIENT
Start: 2023-07-27 | End: 2023-08-16

## 2023-07-27 RX ORDER — GABAPENTIN 100 MG/1
100 CAPSULE ORAL 3 TIMES DAILY
COMMUNITY
Start: 2023-07-11

## 2023-07-27 RX ORDER — MONTELUKAST SODIUM 10 MG/1
10 TABLET ORAL
COMMUNITY
Start: 2023-07-11

## 2023-07-27 RX ORDER — TRAZODONE HYDROCHLORIDE 100 MG/1
100 TABLET ORAL NIGHTLY
COMMUNITY
Start: 2023-06-27

## 2023-07-27 RX ORDER — CETIRIZINE HYDROCHLORIDE 10 MG/1
10 TABLET ORAL
COMMUNITY
Start: 2023-07-27

## 2023-07-27 RX ORDER — HYDROCODONE BITARTRATE AND ACETAMINOPHEN 7.5; 325 MG/1; MG/1
TABLET ORAL
COMMUNITY
Start: 2022-06-09 | End: 2023-11-29

## 2023-07-27 RX ORDER — HYDROCHLOROTHIAZIDE 12.5 MG/1
12.5 TABLET ORAL
COMMUNITY
Start: 2023-07-27

## 2023-07-27 RX ORDER — IBUPROFEN 800 MG/1
TABLET ORAL
COMMUNITY

## 2023-07-27 RX ORDER — ALBUTEROL SULFATE 90 UG/1
2 AEROSOL, METERED RESPIRATORY (INHALATION) EVERY 6 HOURS PRN
COMMUNITY
Start: 2023-05-18

## 2023-07-27 NOTE — PROCEDURES
Debridement    Date/Time: 7/27/2023 12:56 PM  Performed by: Елена Herrera NP  Authorized by: Елена Herrera NP     Consent Done?:  Yes (Verbal)    Preparation: Patient was prepped and draped with clean technique    Local anesthesia used?: No      Wound Details:    Location:  Head (Left ear)    Type of Debridement:  Non-excisional       Length (cm):  3       Area (sq cm):  1.8       Width (cm):  0.6       Percent Debrided (%):  100       Depth (cm):  1.3       Total Area Debrided (sq cm):  1.8    Depth of debridement:  Epidermis/Dermis    Devitalized tissue debrided:  Biofilm, Exudate, Fibrin and Slough    Instruments:  Forceps, Scissors and Curette (Martini)  Bleeding:  None  Patient tolerance:  Patient tolerated the procedure well with no immediate complications

## 2023-07-27 NOTE — PROGRESS NOTES
Subjective:       Patient ID: Radhasandra Finch is a 53 y.o. female.    Chief Complaint: Wound Care (Left ear )    HPI  Ms. Finch was referred to wound clinic today by Dr. Daniel Sofia who is her primary care provider.  Her course of treatment is below:   NOTES  July 12th: Patient started experiencing redness, swelling, and pain noted to area. Left GERTRUDE  July 16th: Pain continued to worsen; presented to [facility] and received PO Clindamycin  July 18th: Pain continued to worsen; presented to ED where area was I&D and patient was instructed to continue Clindamycin and added PO Bactrim DS   July 19th: Patient presented to PCP office; Dr. Sofia was out of office so patient was seen by Dr. Cornell. Dr. Cornell ordered debridement by Dr. Hiro David.  July 21st: Patient was admitted to [facility] and placed on IV Vancomycin.  July 22nd: Patient had I&D by Dr. Hiro David   July 24th: Patient discharged and sent home on PO Clindamycin   July 27th: Follow up with Dr. Sofia where patient was referred to wound care     Today she was assessed by Dr. Sofia who attempted to clean the wound in her office.  However, the patient was an extreme amount of pain and could not tolerate.  She was referred to wound clinic.    Topical lidocaine (5%) was applied to the wound bed and after few minutes, cleansing and debridement began.  She was able to tolerate and a moderate amount of hair and dried fibrin was removed from the wound area along with a moderate amount of slough and purulence drainage.  The reports that she does continue to take her clindamycin as ordered.  She was instructed to wash the area daily with Dial soap once, and cleansed the area the second time daily with Vashe which she was provided with.  Both time she is to follow up the cleansing with mupirocin ointment and keep the wound area clean and covered with a bandage to allow it to remain somewhat moist so that the fibrin  does not dry over the wound bed which will allow it to continue to dry.  She understood all instructions and will return to clinic on Monday for a followup provider visit.  We will determine at that time if she needs additional antibiotics and for a thorough cleansing and debridement.  Today there were no signs and symptoms of infection, no odor, no erythema, no warmth.  However, there was still purulence drainage in the wound bed.      Review of Systems      Objective:      There were no vitals filed for this visit.    Physical Exam       Altered Skin Integrity 07/27/23 1359 Left Ear #1 (Active)   07/27/23 1359   Altered Skin Integrity Present on Admission - Did Patient arrive to the hospital with altered skin?: yes   Side: Left   Orientation:    Location: Ear   Wound Number: #1   Is this injury device related?: No   Primary Wound Type:    Description of Altered Skin Integrity:    Ankle-Brachial Index:    Pulses:    Removal Indication and Assessment:    Wound Outcome:    (Retired) Wound Length (cm):    (Retired) Wound Width (cm):    (Retired) Depth (cm):    Wound Description (Comments):    Removal Indications:    Dressing Appearance Moist drainage 07/27/23 1356   Drainage Amount Moderate 07/27/23 1356   Drainage Characteristics/Odor Purulent 07/27/23 1356   Appearance Moist;Fibrin;Eschar 07/27/23 1356   Tissue loss description Full thickness 07/27/23 1356   Periwound Area Intact;Moist 07/27/23 1356   Wound Edges Open 07/27/23 1356   Wound Length (cm) 3 cm 07/27/23 1356   Wound Width (cm) 0.6 cm 07/27/23 1356   Wound Depth (cm) 1.3 cm 07/27/23 1356   Wound Volume (cm^3) 2.34 cm^3 07/27/23 1356   Wound Surface Area (cm^2) 1.8 cm^2 07/27/23 1356   Care Cleansed with:;Wound cleanser 07/27/23 1356   Dressing Applied;Other (comment) 07/27/23 1356   Dressing Change Due 07/28/23 07/27/23 1356             NO POST DEBRIDEMENT PHOTO  Left ear   MATTIE, bandage   Assessment:         ICD-10-CM ICD-9-CM   1. Postoperative wound  infection  T81.49XA 998.59   2. Pain at surgical site  G89.18 338.18   3. Pain at surgical incision  L76.82 782.0   4. Cellulitis, unspecified cellulitis site  L03.90 682.9           Procedures:     Debridement     Date/Time: 7/27/2023 12:56 PM  Performed by: Елена Herrera NP  Authorized by: Елена Herrera NP      Consent Done?:  Yes (Verbal)     Preparation: Patient was prepped and draped with clean technique    Local anesthesia used?: No       Wound Details:    Location:  Head (Left ear)    Type of Debridement:  Non-excisional       Length (cm):  3       Area (sq cm):  1.8       Width (cm):  0.6       Percent Debrided (%):  100       Depth (cm):  1.3       Total Area Debrided (sq cm):  1.8    Depth of debridement:  Epidermis/Dermis    Devitalized tissue debrided:  Biofilm, Exudate, Fibrin and Slough    Instruments:  Forceps, Scissors and Curette (Martini)  Bleeding:  None  Patient tolerance:  Patient tolerated the procedure well with no immediate complications    [] Yes [] No   I & D performed  [] Yes [] No   Excisional debridement performed  [x] Yes [] No   Selective debridement performed           [] Yes [] No   Mechanical debridement performed         [] Yes [] No  Silver nitrate applied                                     [] Yes [] No  Labs ordered this visit                                  [] Yes [] No   Imaging ordered this visit                           [] Yes [] No   Tissue pathology and/or culture taken         MEDICATIONS    Current Outpatient Medications:     acetaminophen (TYLENOL) 325 MG tablet, Take 2 tablets (650 mg total) by mouth every 8 (eight) hours as needed for Temperature greater than (or equal to 101 degree F)., Disp: , Rfl: 0    albuterol (PROVENTIL/VENTOLIN HFA) 90 mcg/actuation inhaler, 2 puffs every 6 (six) hours as needed., Disp: , Rfl:     atorvastatin (LIPITOR) 10 MG tablet, Take 1 tablet by mouth once daily for 90 days Orally Once a day for 90 days, Disp: , Rfl:      cetirizine (ZYRTEC) 10 MG tablet, Take 10 mg by mouth., Disp: , Rfl:     clindamycin (CLEOCIN) 300 MG capsule, Take 1 capsule (300 mg total) by mouth 2 (two) times a day., Disp: , Rfl:     gabapentin (NEURONTIN) 100 MG capsule, Take 100 mg by mouth 3 (three) times daily., Disp: , Rfl:     hydroCHLOROthiazide (HYDRODIURIL) 12.5 MG Tab, Take 12.5 mg by mouth., Disp: , Rfl:     HYDROcodone-acetaminophen (NORCO) 7.5-325 mg per tablet, Take by mouth., Disp: , Rfl:     ibuprofen (ADVIL,MOTRIN) 800 MG tablet, TAKE 1 TABLET BY MOUTH EVERY 8 HOURS AS NEEDED WITH  FOOD  OR  MILK  FOR  30  DAYS for 30, Disp: , Rfl:     montelukast (SINGULAIR) 10 mg tablet, Take 10 mg by mouth., Disp: , Rfl:     traZODone (DESYREL) 100 MG tablet, Take 100 mg by mouth every evening., Disp: , Rfl:     mupirocin (BACTROBAN) 2 % ointment, Apply topically 2 (two) times daily., Disp: 30 g, Rfl: 1   Review of patient's allergies indicates:   Allergen Reactions    Penicillins Shortness Of Breath     Other reaction(s): sob       DIAGNOSTICS      Labs/Scans/Micro:    CBC:   Lab Results   Component Value Date    WBC 4.83 07/24/2023    HGB 11.9 (L) 07/24/2023    HCT 36.2 (L) 07/24/2023    MCV 90.3 07/24/2023     07/24/2023       Sedimentation rate: No results found for: SEDRATE C-reactive protein: No results found for: CRP Chemistry: [unfilled]  HBA1C: No components found for: HBA1C    Ankle Brachial Index: No results found for this or any previous visit.      Imaging:    Plain film: No results found for this or any previous visit.    MRI: No results found for this or any previous visit.   No results found for this or any previous visit.    Bone Scan: No results found for this or any previous visit.   No results found for this or any previous visit.      Vascular studies:     Microbiology: Patient currently on ABX, no culture obtained    HOME HEALTH AGENCY:    TIMES PER WEEK/DAYS:    WOUND CARE ORDERS:  Left ear wound: Cleanse with vashe, apply  mupirocin ointment and cover with bandage to be changed twice daily       Follow up in 4 days (on 7/31/2023) for left ear .

## 2023-07-29 LAB
BACTERIA SPEC ANAEROBE CULT: ABNORMAL
BACTERIA SPEC ANAEROBE CULT: ABNORMAL

## 2023-07-31 ENCOUNTER — HOSPITAL ENCOUNTER (OUTPATIENT)
Dept: WOUND CARE | Facility: HOSPITAL | Age: 53
Discharge: HOME | End: 2023-07-31
Attending: NURSE PRACTITIONER
Payer: MEDICAID

## 2023-07-31 VITALS
SYSTOLIC BLOOD PRESSURE: 132 MMHG | HEIGHT: 62 IN | HEART RATE: 94 BPM | WEIGHT: 173 LBS | BODY MASS INDEX: 31.83 KG/M2 | RESPIRATION RATE: 18 BRPM | DIASTOLIC BLOOD PRESSURE: 69 MMHG

## 2023-07-31 DIAGNOSIS — G89.18 PAIN AT SURGICAL SITE: ICD-10-CM

## 2023-07-31 DIAGNOSIS — L76.82 PAIN AT SURGICAL INCISION: ICD-10-CM

## 2023-07-31 DIAGNOSIS — L03.90 CELLULITIS, UNSPECIFIED CELLULITIS SITE: ICD-10-CM

## 2023-07-31 DIAGNOSIS — T81.49XA POSTOPERATIVE WOUND INFECTION: Primary | ICD-10-CM

## 2023-07-31 PROCEDURE — 27000999 HC MEDICAL RECORD PHOTO DOCUMENTATION

## 2023-07-31 PROCEDURE — 97597 DBRDMT OPN WND 1ST 20 CM/<: CPT

## 2023-07-31 PROCEDURE — 99213 OFFICE O/P EST LOW 20 MIN: CPT

## 2023-07-31 RX ORDER — CLINDAMYCIN HYDROCHLORIDE 300 MG/1
300 CAPSULE ORAL 2 TIMES DAILY
Qty: 7 CAPSULE | Refills: 0 | Status: SHIPPED | OUTPATIENT
Start: 2023-07-31 | End: 2023-08-16

## 2023-07-31 NOTE — PROCEDURES
"Debridement    Date/Time: 7/31/2023 7:57 AM    Performed by: Елена Herrera NP  Authorized by: Елена Herrera NP    Time out: Immediately prior to procedure a "time out" was called to verify the correct patient, procedure, equipment, support staff and site/side marked as required.    Consent Done?:  Yes (Verbal)    Preparation: Patient was prepped and draped with clean technique      Wound Details:    Location:  Head (Left ear)    Type of Debridement:  Non-excisional       Length (cm):  2       Area (sq cm):  1       Width (cm):  0.5       Percent Debrided (%):  100       Depth (cm):  0.2       Total Area Debrided (sq cm):  1    Depth of debridement:  Epidermis/Dermis    Devitalized tissue debrided:  Biofilm, Exudate, Fibrin and Slough    Instruments:  Forceps, Scissors and Curette (Dermal, Martini)  Bleeding:  None  Patient tolerance:  Patient tolerated the procedure well with no immediate complications    "

## 2023-07-31 NOTE — PROGRESS NOTES
Subjective:       Patient ID: Radha Finch is a 53 y.o. female.    Chief Complaint: Wound Care (Left ear)    HPI  7/27/23 -     July 12th: Patient started experiencing redness, swelling, and pain noted to area. Left GERTRUDE  July 16th: Pain continued to worsen; presented to [facility] and received PO Clindamycin  July 18th: Pain continued to worsen; presented to ED where area was I&D and patient was instructed to continue Clindamycin and added PO Bactrim DS   July 19th: Patient presented to PCP office; Dr. Sofia was out of office so patient was seen by Dr. Cornell. Dr. Cornell ordered debridement by Dr. Hiro David.  July 21st: Patient was admitted to [facility] and placed on IV Vancomycin.  July 22nd: Patient had I&D by Dr. Hiro David   July 24th: Patient discharged and sent home on PO Clindamycin   July 27th: Follow up with Dr. Sofia where patient was referred to wound care     Today she was assessed by Dr. Sofia who attempted to clean the wound in her office.  However, the patient was an extreme amount of pain and could not tolerate.  She was referred to wound clinic.    Topical lidocaine (5%) was applied to the wound bed and after few minutes, cleansing and debridement began.  She was able to tolerate and a moderate amount of hair and dried fibrin was removed from the wound area along with a moderate amount of slough and purulence drainage.  The reports that she does continue to take her clindamycin as ordered.  She was instructed to wash the area daily with Dial soap once, and cleansed the area the second time daily with Vashe which she was provided with.  Both time she is to follow up the cleansing with mupirocin ointment and keep the wound area clean and covered with a bandage to allow it to remain somewhat moist so that the fibrin does not dry over the wound bed which will allow it to continue to dry.  She understood all instructions and will return to clinic on  Monday for a followup provider visit.  We will determine at that time if she needs additional antibiotics and for a thorough cleansing and debridement.  Today there were no signs and symptoms of infection, no odor, no erythema, no warmth.  However, there was still purulence drainage in the wound bed.    7/31/23 - Ms. Finch today for followup on the open wound to the left ear.  Today there is considerable improvement over her initial visit on 07/27/2023.  There is still a moderate quantity of slough in the wound bed and it is still very sensitive to the touch, however, the swelling has gone down substantially and it it is much less sensitive.  She continues to take her p.o. clindamycin and has 1 day remaining.  A refill has been sent for an additional 7 days.  She is also still currently applying mupirocin ointment to the wound bed twice daily as well as washing with Dial soap once daily.  Today, the wound bed was selectively debrided with removal of slough and eschar.  She will return on Thursday for a followup visit.      Review of Systems      Objective:      Vitals:    07/31/23 0805   BP: 132/69   Pulse: 94   Resp: 18       Physical Exam       Altered Skin Integrity 07/27/23 1359 Left Ear #1 (Active)   07/27/23 1359   Altered Skin Integrity Present on Admission - Did Patient arrive to the hospital with altered skin?: yes   Side: Left   Orientation:    Location: Ear   Wound Number: #1   Is this injury device related?: No   Primary Wound Type:    Description of Altered Skin Integrity:    Ankle-Brachial Index:    Pulses:    Removal Indication and Assessment:    Wound Outcome:    (Retired) Wound Length (cm):    (Retired) Wound Width (cm):    (Retired) Depth (cm):    Wound Description (Comments):    Removal Indications:    Dressing Appearance Moist drainage 07/31/23 0806   Drainage Amount Moderate 07/31/23 0806   Drainage Characteristics/Odor Serosanguineous 07/31/23 0806   Appearance Moist;Fibrin;Slough;Tan 07/31/23  "0806   Tissue loss description Full thickness 07/31/23 0806   Periwound Area Intact;Moist 07/31/23 0806   Wound Edges Open 07/31/23 0806   Wound Length (cm) 2 cm 07/31/23 0806   Wound Width (cm) 0.5 cm 07/31/23 0806   Wound Depth (cm) 0.2 cm 07/31/23 0806   Wound Volume (cm^3) 0.2 cm^3 07/31/23 0806   Wound Surface Area (cm^2) 1 cm^2 07/31/23 0806   Care Cleansed with:;Wound cleanser 07/31/23 0806   Dressing Applied;Other (comment) 07/31/23 0806   Dressing Change Due 08/01/23 07/31/23 0806 7/31/23            Left ear   Mupirocin ointment, mesalt, bandage   CT    Assessment:         ICD-10-CM ICD-9-CM   1. Postoperative wound infection  T81.49XA 998.59   2. Pain at surgical site  G89.18 338.18   3. Pain at surgical incision  L76.82 782.0   4. Cellulitis, unspecified cellulitis site  L03.90 682.9             Procedures:     Debridement     Date/Time: 7/31/2023 7:57 AM     Performed by: Елена Herrera NP  Authorized by: Елена Herrera NP    Time out: Immediately prior to procedure a "time out" was called to verify the correct patient, procedure, equipment, support staff and site/side marked as required.     Consent Done?:  Yes (Verbal)     Preparation: Patient was prepped and draped with clean technique       Wound Details:    Location:  Head (Left ear)    Type of Debridement:  Non-excisional       Length (cm):  2       Area (sq cm):  1       Width (cm):  0.5       Percent Debrided (%):  100       Depth (cm):  0.2       Total Area Debrided (sq cm):  1    Depth of debridement:  Epidermis/Dermis    Devitalized tissue debrided:  Biofilm, Exudate, Fibrin and Slough    Instruments:  Forceps, Scissors and Curette (Dermal, Martini)  Bleeding:  None  Patient tolerance:  Patient tolerated the procedure well with no immediate complications    [] Yes [] No   I & D performed  [] Yes [] No   Excisional debridement performed  [x] Yes [] No   Selective debridement performed           [] Yes [] No   Mechanical " debridement performed         [] Yes [] No  Silver nitrate applied                                     [] Yes [] No  Labs ordered this visit                                  [] Yes [] No   Imaging ordered this visit                           [] Yes [] No   Tissue pathology and/or culture taken         MEDICATIONS    Current Outpatient Medications:     acetaminophen (TYLENOL) 325 MG tablet, Take 2 tablets (650 mg total) by mouth every 8 (eight) hours as needed for Temperature greater than (or equal to 101 degree F)., Disp: , Rfl: 0    albuterol (PROVENTIL/VENTOLIN HFA) 90 mcg/actuation inhaler, 2 puffs every 6 (six) hours as needed., Disp: , Rfl:     atorvastatin (LIPITOR) 10 MG tablet, Take 1 tablet by mouth once daily for 90 days Orally Once a day for 90 days, Disp: , Rfl:     cetirizine (ZYRTEC) 10 MG tablet, Take 10 mg by mouth., Disp: , Rfl:     clindamycin (CLEOCIN) 300 MG capsule, Take 1 capsule (300 mg total) by mouth 2 (two) times a day., Disp: , Rfl:     gabapentin (NEURONTIN) 100 MG capsule, Take 100 mg by mouth 3 (three) times daily., Disp: , Rfl:     hydroCHLOROthiazide (HYDRODIURIL) 12.5 MG Tab, Take 12.5 mg by mouth., Disp: , Rfl:     HYDROcodone-acetaminophen (NORCO) 7.5-325 mg per tablet, Take by mouth., Disp: , Rfl:     ibuprofen (ADVIL,MOTRIN) 800 MG tablet, TAKE 1 TABLET BY MOUTH EVERY 8 HOURS AS NEEDED WITH  FOOD  OR  MILK  FOR  30  DAYS for 30, Disp: , Rfl:     montelukast (SINGULAIR) 10 mg tablet, Take 10 mg by mouth., Disp: , Rfl:     mupirocin (BACTROBAN) 2 % ointment, Apply topically 2 (two) times daily., Disp: 30 g, Rfl: 1    traZODone (DESYREL) 100 MG tablet, Take 100 mg by mouth every evening., Disp: , Rfl:     clindamycin (CLEOCIN) 300 MG capsule, Take 1 capsule (300 mg total) by mouth 2 (two) times a day., Disp: 7 capsule, Rfl: 0   Review of patient's allergies indicates:   Allergen Reactions    Penicillins Shortness Of Breath     Other reaction(s): sob  "      DIAGNOSTICS      Labs/Scans/Micro:    CBC:   Lab Results   Component Value Date    WBC 4.83 07/24/2023    HGB 11.9 (L) 07/24/2023    HCT 36.2 (L) 07/24/2023    MCV 90.3 07/24/2023     07/24/2023       Sedimentation rate: No results found for: "SEDRATE" C-reactive protein: No results found for: "CRP" Chemistry: [unfilled]  HBA1C: No components found for: "HBA1C"    Ankle Brachial Index: No results found for this or any previous visit.      Imaging:    Plain film: No results found for this or any previous visit.    MRI: No results found for this or any previous visit.   No results found for this or any previous visit.    Bone Scan: No results found for this or any previous visit.   No results found for this or any previous visit.      Vascular studies:     Microbiology: Patient currently on ABX; refill sent    HOME HEALTH AGENCY:    TIMES PER WEEK/DAYS:    WOUND CARE ORDERS:  Left ear wound: Cleanse with vashe, apply mupirocin ointment, mesalt, and cover with bandage to be changed twice daily       Follow up in 3 days (on 8/3/2023) for left ear .       "

## 2023-08-03 ENCOUNTER — HOSPITAL ENCOUNTER (OUTPATIENT)
Dept: WOUND CARE | Facility: HOSPITAL | Age: 53
Discharge: HOME | End: 2023-08-03
Attending: NURSE PRACTITIONER
Payer: MEDICAID

## 2023-08-03 DIAGNOSIS — L03.90 CELLULITIS, UNSPECIFIED CELLULITIS SITE: ICD-10-CM

## 2023-08-03 DIAGNOSIS — L76.82 PAIN AT SURGICAL INCISION: ICD-10-CM

## 2023-08-03 DIAGNOSIS — G89.18 PAIN AT SURGICAL SITE: ICD-10-CM

## 2023-08-03 DIAGNOSIS — T81.49XA POSTOPERATIVE WOUND INFECTION: Primary | ICD-10-CM

## 2023-08-03 NOTE — PROGRESS NOTES
Subjective:       Patient ID: Radha Finch is a 53 y.o. female.    Chief Complaint: Wound Care (Left ear)    HPI  7/27/23 -     July 12th: Patient started experiencing redness, swelling, and pain noted to area. Left GERTRUDE  July 16th: Pain continued to worsen; presented to [facility] and received PO Clindamycin  July 18th: Pain continued to worsen; presented to ED where area was I&D and patient was instructed to continue Clindamycin and added PO Bactrim DS   July 19th: Patient presented to PCP office; Dr. Sofia was out of office so patient was seen by Dr. Cornell. Dr. Cornell ordered debridement by Dr. Hiro David.  July 21st: Patient was admitted to [facility] and placed on IV Vancomycin.  July 22nd: Patient had I&D by Dr. Hiro David   July 24th: Patient discharged and sent home on PO Clindamycin   July 27th: Follow up with Dr. Sofia where patient was referred to wound care     Today she was assessed by Dr. Sofia who attempted to clean the wound in her office.  However, the patient was an extreme amount of pain and could not tolerate.  She was referred to wound clinic.    Topical lidocaine (5%) was applied to the wound bed and after few minutes, cleansing and debridement began.  She was able to tolerate and a moderate amount of hair and dried fibrin was removed from the wound area along with a moderate amount of slough and purulence drainage.  The reports that she does continue to take her clindamycin as ordered.  She was instructed to wash the area daily with Dial soap once, and cleansed the area the second time daily with Vashe which she was provided with.  Both time she is to follow up the cleansing with mupirocin ointment and keep the wound area clean and covered with a bandage to allow it to remain somewhat moist so that the fibrin does not dry over the wound bed which will allow it to continue to dry.  She understood all instructions and will return to clinic on  Monday for a followup provider visit.  We will determine at that time if she needs additional antibiotics and for a thorough cleansing and debridement.  Today there were no signs and symptoms of infection, no odor, no erythema, no warmth.  However, there was still purulence drainage in the wound bed.    7/31/23 - Ms. Finch today for followup on the open wound to the left ear.  Today there is considerable improvement over her initial visit on 07/27/2023.  There is still a moderate quantity of slough in the wound bed and it is still very sensitive to the touch, however, the swelling has gone down substantially and it it is much less sensitive.  She continues to take her p.o. clindamycin and has 1 day remaining.  A refill has been sent for an additional 7 days.  She is also still currently applying mupirocin ointment to the wound bed twice daily as well as washing with Dial soap once daily.  Today, the wound bed was selectively debrided with removal of slough and eschar.  She will return on Thursday for a followup visit.    8/3/23 - Ms. Finch follows up today for the open wound at the left ear.  This wound continues to improve considerably over each week.  She is doing an excellent job with cleansing the wound and applying topical antibiotics.  Again today the wound was selectively debrided with removal of a moderate amount of slough and eschar.  It was noted that she does have a decreased amount of pain each week.  She will continue to apply mupirocin ointment to the wound bed daily as well as cleansing with Dial soap.  She has not begun taking the refill of Clindamycin.  We discussed today that it is not needed at this time.      Review of Systems      Objective:      There were no vitals filed for this visit.      Physical Exam       Altered Skin Integrity 07/27/23 1359 Left Ear #1 (Active)   07/27/23 1359   Altered Skin Integrity Present on Admission - Did Patient arrive to the hospital with altered skin?: yes  "  Side: Left   Orientation:    Location: Ear   Wound Number: #1   Is this injury device related?: No   Primary Wound Type:    Description of Altered Skin Integrity:    Ankle-Brachial Index:    Pulses:    Removal Indication and Assessment:    Wound Outcome:    (Retired) Wound Length (cm):    (Retired) Wound Width (cm):    (Retired) Depth (cm):    Wound Description (Comments):    Removal Indications:    Description of Altered Skin Integrity Full thickness tissue loss. Base is covered by slough and/or eschar in the wound bed 08/03/23 1310   Dressing Appearance Intact;Moist drainage 08/03/23 1310   Drainage Amount Moderate 08/03/23 1310   Drainage Characteristics/Odor Serosanguineous 08/03/23 1310   Appearance Intact;Moist;Eschar;Slough;Epithelialization;Granulating 08/03/23 1310   Tissue loss description Full thickness 08/03/23 1310   Periwound Area Intact;Moist 08/03/23 1310   Wound Edges Open 08/03/23 1310   Wound Length (cm) 2 cm 08/03/23 1310   Wound Width (cm) 0.2 cm 08/03/23 1310   Wound Depth (cm) 0.2 cm 08/03/23 1310   Wound Volume (cm^3) 0.08 cm^3 08/03/23 1310   Wound Surface Area (cm^2) 0.4 cm^2 08/03/23 1310   Care Cleansed with:;Wound cleanser;Debrided 08/03/23 1310   Dressing Applied 08/03/23 1310   Dressing Change Due 08/04/23 08/03/23 1310           8/3/23            Left ear-PRE                                                   POST  TOA, mesalt, bandaid  Assessment:         ICD-10-CM ICD-9-CM   1. Postoperative wound infection  T81.49XA 998.59   2. Pain at surgical site  G89.18 338.18   3. Pain at surgical incision  L76.82 782.0   4. Cellulitis, unspecified cellulitis site  L03.90 682.9               Procedures:     Debridement     Date/Time: 8/3/2023 12:56 PM     Performed by: Елена Herrera NP  Authorized by: Елена Herrera NP    Time out: Immediately prior to procedure a "time out" was called to verify the correct patient, procedure, equipment, support staff and site/side marked as " required.     Consent Done?:  Yes (Verbal)     Preparation: Patient was prepped and draped with clean technique    Local anesthesia used?: No       Wound Details:    Location:  Head (Left ear)    Type of Debridement:  Non-excisional       Length (cm):  2       Area (sq cm):  0.4       Width (cm):  0.2       Percent Debrided (%):  100       Depth (cm):  0.2       Total Area Debrided (sq cm):  0.4    Depth of debridement:  Epidermis/Dermis    Devitalized tissue debrided:  Biofilm, Exudate, Fibrin and Slough    Instruments:  Forceps and Scissors (Martini)  Bleeding:  None  Patient tolerance:  Patient tolerated the procedure well with no immediate complications    [] Yes [] No   I & D performed  [x] Yes [] No   Excisional debridement performed   [] Yes [] No   Selective debridement performed           [] Yes [] No   Mechanical debridement performed         [] Yes [] No  Silver nitrate applied                                     [] Yes [] No  Labs ordered this visit                                  [] Yes [] No   Imaging ordered this visit                           [] Yes [] No   Tissue pathology and/or culture taken         MEDICATIONS    Current Outpatient Medications:     acetaminophen (TYLENOL) 325 MG tablet, Take 2 tablets (650 mg total) by mouth every 8 (eight) hours as needed for Temperature greater than (or equal to 101 degree F)., Disp: , Rfl: 0    albuterol (PROVENTIL/VENTOLIN HFA) 90 mcg/actuation inhaler, 2 puffs every 6 (six) hours as needed., Disp: , Rfl:     atorvastatin (LIPITOR) 10 MG tablet, Take 1 tablet by mouth once daily for 90 days Orally Once a day for 90 days, Disp: , Rfl:     cetirizine (ZYRTEC) 10 MG tablet, Take 10 mg by mouth., Disp: , Rfl:     clindamycin (CLEOCIN) 300 MG capsule, Take 1 capsule (300 mg total) by mouth 2 (two) times a day., Disp: , Rfl:     clindamycin (CLEOCIN) 300 MG capsule, Take 1 capsule (300 mg total) by mouth 2 (two) times a day., Disp: 7 capsule, Rfl: 0    gabapentin  "(NEURONTIN) 100 MG capsule, Take 100 mg by mouth 3 (three) times daily., Disp: , Rfl:     hydroCHLOROthiazide (HYDRODIURIL) 12.5 MG Tab, Take 12.5 mg by mouth., Disp: , Rfl:     HYDROcodone-acetaminophen (NORCO) 7.5-325 mg per tablet, Take by mouth., Disp: , Rfl:     ibuprofen (ADVIL,MOTRIN) 800 MG tablet, TAKE 1 TABLET BY MOUTH EVERY 8 HOURS AS NEEDED WITH  FOOD  OR  MILK  FOR  30  DAYS for 30, Disp: , Rfl:     montelukast (SINGULAIR) 10 mg tablet, Take 10 mg by mouth., Disp: , Rfl:     mupirocin (BACTROBAN) 2 % ointment, Apply topically 2 (two) times daily., Disp: 30 g, Rfl: 1    traZODone (DESYREL) 100 MG tablet, Take 100 mg by mouth every evening., Disp: , Rfl:    Review of patient's allergies indicates:   Allergen Reactions    Penicillins Shortness Of Breath     Other reaction(s): sob       DIAGNOSTICS      Labs/Scans/Micro:    CBC:   Lab Results   Component Value Date    WBC 4.83 07/24/2023    HGB 11.9 (L) 07/24/2023    HCT 36.2 (L) 07/24/2023    MCV 90.3 07/24/2023     07/24/2023       Sedimentation rate: No results found for: "SEDRATE" C-reactive protein: No results found for: "CRP" Chemistry: [unfilled]  HBA1C: No components found for: "HBA1C"    Ankle Brachial Index: No results found for this or any previous visit.      Imaging:    Plain film: No results found for this or any previous visit.    MRI: No results found for this or any previous visit.   No results found for this or any previous visit.    Bone Scan: No results found for this or any previous visit.   No results found for this or any previous visit.      Vascular studies:     Microbiology: Patient currently on ABX; refill sent - patient did not start abx    HOME HEALTH AGENCY:    TIMES PER WEEK/DAYS:    WOUND CARE ORDERS:  Left ear wound: Cleanse with vashe, apply mupirocin ointment, mesalt, and cover with bandage to be changed twice daily       Follow up in about 1 week (around 8/10/2023) for left ear.       "

## 2023-08-03 NOTE — PROGRESS NOTES
[facility]                     [address] [facility] [address]    PATIENT NAME:       PAULINA SOTO  YOB: 1970  CSN:                [id number]   MRN:                [id number]  ADMIT DATE:         07/21/2023 16:51:00  PHYSICIAN:          Jeyson Cornell MD                            PROGRESS NOTE    DATE:      SUBJECTIVE:  She is a patient of Dr. Sofia, but she is out of town this weekend.    She was admitted with failed treatment of her periauricular swelling and   cellulitis of the left ear.  Dr. Bosch has been consulted.  She is on IV   antibiotics and Toradol for pain.    OBJECTIVE:  VITAL SIGNS:  Stable and on the chart.  HEART:  Regular rate and rhythm.  LUNGS:  Clear to auscultation bilaterally.    ABDOMEN:  Nontender, nondistended.  Normoactive bowel sounds.  EXTREMITIES:  No significant edema.    CT of the maxillofacial area shows no abscess formation.  However, clinically,   there is some abscess/swelling underneath the cellulitis.  Her chest x-ray is   clear.  She did require a PICC line, however, secondary to poor IV access.    Labs, x-rays, and medications are on the chart and reviewed.    ASSESSMENT:    1. Periauricular cellulitis with abscess formation on the left side requiring   surgical debridement, failing outpatient treatment.  2. Penicillin allergy.  3. Underlying medical problems already documented in H and P.    PLAN:  Agree with proceeding with incision and drainage.  Continuing IV   antibiotics and following up in the morning.      ______________________________  Jeyson Cornell MD    PBS/AQS  DD:  08/03/2023  Time:  12:19PM  DT:  08/03/2023  Time:  12:58PM  Job #:  [id number]      PROGRESS NOTE

## 2023-08-03 NOTE — DISCHARGE SUMMARY
[facility]                     [address] [facility] [address]    PATIENT NAME:       PAULINA SOTO  YOB: 1970  CSN:                [id number]   MRN:                [id number]  ADMIT DATE:         07/21/2023 16:51:00  PHYSICIAN:          Jeyson Cornell MD                          DISCHARGE SUMMARY    DATE OF DISCHARGE:  07/24/2023 18:40:00    She is admitted on the 21st of this month for preauricular cellulitis with   significant amount of pain and abscess formation, failing outpatient treatment.    She received pain medication and ear drops for her pain and also Dr. Bosch saw   her in consultation.  He performed an incision and drainage of the area.    Postop, she did well.  She is using Bactroban as well.  She will be discharged   home today in stable condition.  Plan to discharge the patient home.  Follow up   with Dr. Bosch outpatient.  Followup with Dr. Dina Sofia outpatient next week.  Continue   her Tylenol 650 mg q.8 hours p.r.n. pain.  Continue clindamycin 300 mg b.i.d.   and Bactroban topically 3 times a day.  Continue dressing changes as instructed   by the nurses.    ACTIVITY:  As tolerated.    DIET:  Cardiac diet.    ASSESSMENT:    1. Cellulitis of the left auricular area with abscess formation requiring I and   D.  2. Chest pain, atypical.  This has resolved.  Chest x-ray is clear.  3. Poor IV access requiring PICC line during hospitalization.  4. Underlying prediabetes.  5. Underlying Hang-Parkinson-White syndrome.  6. Penicillin allergy.        ______________________________  Jeyson Cornell MD    PBS/AQS  DD:  08/03/2023  Time:  12:27PM  DT:  08/03/2023  Time:  01:58PM  Job #:  [id number]      DISCHARGE SUMMARY

## 2023-08-03 NOTE — PROGRESS NOTES
[facility]                     [address] [id number]    PATIENT NAME:       RADHA SOTO  YOB: 1970  CSN:                [id number]   MRN:                [id number]  ADMIT DATE:         07/21/2023 16:51:00  PHYSICIAN:          Jeyson Cornell MD                            PROGRESS NOTE    DATE:  07/23/2023 00:00:00    SUBJECTIVE:  Ms. Radha Soto was admitted on the 21st for abscess formation and   cellulitis failing outpatient treatment of the left side of face/preauricular   area.  She has had pilonidal cysts in the past and she believes MRSA.  She is on   IV antibiotics to cover for this.      Labs, x-rays, and medications are on the chart and reviewed as well as   consultations.    PHYSICAL EXAMINATION:  VITAL SIGNS:  Stable and on the chart.    HEENT:  She continues with pain of her left ear.  The ear drops and Toradol seem   to be helping some.  She has on pain medication as well.  The area has been   incised and is dressed and looks good.     HEART:  Regular rate and rhythm.  LUNGS:  Clear to auscultation bilaterally.  ABDOMEN:  Soft.  EXTREMITIES:  No significant edema.    ASSESSMENT:    1. Status post incision and drainage of the cellulitis/abscess of the superior   preauricular region on the left side.  This was not done yesterday.  2. Penicillin allergy.  3. Multiple other medical problems as per problem list.    PLAN:  Continue IV antibiotics, dressing changes, pain medications, and follow   up in the morning.  Possibly discharge tomorrow.        ______________________________  Jeyson Cornell MD    PBS/AQS  DD:  08/03/2023  Time:  12:21PM  DT:  08/03/2023  Time:  12:57PM  Job #:  [id number]      PROGRESS NOTE

## 2023-08-03 NOTE — PROCEDURES
"Debridement    Date/Time: 8/3/2023 12:56 PM    Performed by: Елена Herrera NP  Authorized by: Елена Herrera NP    Time out: Immediately prior to procedure a "time out" was called to verify the correct patient, procedure, equipment, support staff and site/side marked as required.    Consent Done?:  Yes (Verbal)    Preparation: Patient was prepped and draped with clean technique    Local anesthesia used?: No      Wound Details:    Location:  Head (Left ear)    Type of Debridement:  Non-excisional       Length (cm):  2       Area (sq cm):  0.4       Width (cm):  0.2       Percent Debrided (%):  100       Depth (cm):  0.2       Total Area Debrided (sq cm):  0.4    Depth of debridement:  Epidermis/Dermis    Devitalized tissue debrided:  Biofilm, Exudate, Fibrin and Slough    Instruments:  Forceps and Scissors (Martini)  Bleeding:  None  Patient tolerance:  Patient tolerated the procedure well with no immediate complications    "

## 2023-08-09 ENCOUNTER — HOSPITAL ENCOUNTER (OUTPATIENT)
Dept: WOUND CARE | Facility: HOSPITAL | Age: 53
Discharge: HOME | End: 2023-08-09
Attending: NURSE PRACTITIONER
Payer: MEDICAID

## 2023-08-09 VITALS
BODY MASS INDEX: 31.83 KG/M2 | SYSTOLIC BLOOD PRESSURE: 118 MMHG | RESPIRATION RATE: 17 BRPM | WEIGHT: 173 LBS | DIASTOLIC BLOOD PRESSURE: 64 MMHG | HEIGHT: 62 IN | HEART RATE: 67 BPM

## 2023-08-09 DIAGNOSIS — G89.18 PAIN AT SURGICAL SITE: ICD-10-CM

## 2023-08-09 DIAGNOSIS — L03.90 CELLULITIS, UNSPECIFIED CELLULITIS SITE: ICD-10-CM

## 2023-08-09 DIAGNOSIS — T81.49XA POSTOPERATIVE WOUND INFECTION: Primary | ICD-10-CM

## 2023-08-09 DIAGNOSIS — L76.82 PAIN AT SURGICAL INCISION: ICD-10-CM

## 2023-08-09 PROCEDURE — 27000999 HC MEDICAL RECORD PHOTO DOCUMENTATION

## 2023-08-09 PROCEDURE — 99212 OFFICE O/P EST SF 10 MIN: CPT

## 2023-08-09 NOTE — PROGRESS NOTES
Subjective:       Patient ID: Radha Finch is a 53 y.o. female.    Chief Complaint: Wound Care (Left ear)    HPI  7/27/23 -     July 12th: Patient started experiencing redness, swelling, and pain noted to area. Left GERTRUDE  July 16th: Pain continued to worsen; presented to [facility] and received PO Clindamycin  July 18th: Pain continued to worsen; presented to ED where area was I&D and patient was instructed to continue Clindamycin and added PO Bactrim DS   July 19th: Patient presented to PCP office; Dr. Sofia was out of office so patient was seen by Dr. Cornell. Dr. Cornell ordered debridement by Dr. Hiro David.  July 21st: Patient was admitted to [facility] and placed on IV Vancomycin.  July 22nd: Patient had I&D by Dr. Hiro David   July 24th: Patient discharged and sent home on PO Clindamycin   July 27th: Follow up with Dr. Sofia where patient was referred to wound care     Today she was assessed by Dr. Sofia who attempted to clean the wound in her office.  However, the patient was an extreme amount of pain and could not tolerate.  She was referred to wound clinic.    Topical lidocaine (5%) was applied to the wound bed and after few minutes, cleansing and debridement began.  She was able to tolerate and a moderate amount of hair and dried fibrin was removed from the wound area along with a moderate amount of slough and purulence drainage.  The reports that she does continue to take her clindamycin as ordered.  She was instructed to wash the area daily with Dial soap once, and cleansed the area the second time daily with Vashe which she was provided with.  Both time she is to follow up the cleansing with mupirocin ointment and keep the wound area clean and covered with a bandage to allow it to remain somewhat moist so that the fibrin does not dry over the wound bed which will allow it to continue to dry.  She understood all instructions and will return to clinic on  Monday for a followup provider visit.  We will determine at that time if she needs additional antibiotics and for a thorough cleansing and debridement.  Today there were no signs and symptoms of infection, no odor, no erythema, no warmth.  However, there was still purulence drainage in the wound bed.    23 - Ms. Finch today for followup on the open wound to the left ear.  Today there is considerable improvement over her initial visit on 2023.  There is still a moderate quantity of slough in the wound bed and it is still very sensitive to the touch, however, the swelling has gone down substantially and it it is much less sensitive.  She continues to take her p.o. clindamycin and has 1 day remaining.  A refill has been sent for an additional 7 days.  She is also still currently applying mupirocin ointment to the wound bed twice daily as well as washing with Dial soap once daily.  Today, the wound bed was selectively debrided with removal of slough and eschar.  She will return on Thursday for a followup visit.    8/3/23 - Ms. Finch follows up today for the open wound at the left ear.  This wound continues to improve considerably over each week.  She is doing an excellent job with cleansing the wound and applying topical antibiotics.  Again today the wound was selectively debrided with removal of a moderate amount of slough and eschar.  It was noted that she does have a decreased amount of pain each week.  She will continue to apply mupirocin ointment to the wound bed daily as well as cleansing with Dial soap.  She has not begun taking the refill of Clindamycin.  We discussed today that it is not needed at this time.    23 - Ms. Finch returns today for followup on the wound to the left ear.  Again today the wound has improved considerably, and at this point there is a very small area that remains open.  She will continue to apply mupirocin ointment through Saturday evening, washing daily with  soap.   On Sunday she will stop the mupirocin ointment and just wash the wound daily.  She will return to clinic on Wednesday to reassess wound without the use of mupirocin to see if there is any deterioration.  She was instructed that she can leave the area open to air with just the mupirocin if she is just around the home.  If she is at work she is to cover the wound and continue to wear her hair net.      Review of Systems      Objective:      Vitals:    08/09/23 1417   BP: 118/64   Pulse: 67   Resp: 17         Physical Exam       Altered Skin Integrity 07/27/23 1359 Left Ear #1 (Active)   07/27/23 1359   Altered Skin Integrity Present on Admission - Did Patient arrive to the hospital with altered skin?: yes   Side: Left   Orientation:    Location: Ear   Wound Number: #1   Is this injury device related?: No   Primary Wound Type:    Description of Altered Skin Integrity:    Ankle-Brachial Index:    Pulses:    Removal Indication and Assessment:    Wound Outcome:    (Retired) Wound Length (cm):    (Retired) Wound Width (cm):    (Retired) Depth (cm):    Wound Description (Comments):    Removal Indications:    Description of Altered Skin Integrity Full thickness tissue loss. Subcutaneous fat may be visible but bone, tendon or muscle are not exposed 08/09/23 1418   Dressing Appearance Moist drainage;Intact 08/09/23 1418   Drainage Amount Moderate 08/09/23 1418   Drainage Characteristics/Odor Serosanguineous 08/09/23 1418   Appearance Epithelialization;Moist;Slough;Intact;Pink 08/09/23 1418   Tissue loss description Full thickness 08/09/23 1418   Periwound Area Intact;Dry 08/09/23 1418   Wound Edges Open 08/09/23 1418   Wound Length (cm) 0.9 cm 08/09/23 1418   Wound Width (cm) 0.3 cm 08/09/23 1418   Wound Depth (cm) 0.3 cm 08/09/23 1418   Wound Volume (cm^3) 0.081 cm^3 08/09/23 1418   Wound Surface Area (cm^2) 0.27 cm^2 08/09/23 1418   Care Cleansed with:;Wound cleanser 08/09/23 1418   Dressing Applied 08/09/23 1418   Dressing  Change Due 08/10/23 08/09/23 1418                    8/9/23  Left ear- pre                                                       post  MATTIE  Assessment:         ICD-10-CM ICD-9-CM   1. Postoperative wound infection  T81.49XA 998.59   2. Pain at surgical site  G89.18 338.18   3. Pain at surgical incision  L76.82 782.0   4. Cellulitis, unspecified cellulitis site  L03.90 682.9         Procedures:     Mechanical debridement only    [] Yes [] No   I & D performed  [] Yes [] No   Excisional debridement performed   [] Yes [] No   Selective debridement performed           [x] Yes [] No   Mechanical debridement performed    [] Yes [] No  Silver nitrate applied                                     [] Yes [] No  Labs ordered this visit                                  [] Yes [] No   Imaging ordered this visit                           [] Yes [] No   Tissue pathology and/or culture taken         MEDICATIONS    Current Outpatient Medications:     acetaminophen (TYLENOL) 325 MG tablet, Take 2 tablets (650 mg total) by mouth every 8 (eight) hours as needed for Temperature greater than (or equal to 101 degree F)., Disp: , Rfl: 0    albuterol (PROVENTIL/VENTOLIN HFA) 90 mcg/actuation inhaler, 2 puffs every 6 (six) hours as needed., Disp: , Rfl:     atorvastatin (LIPITOR) 10 MG tablet, Take 1 tablet by mouth once daily for 90 days Orally Once a day for 90 days, Disp: , Rfl:     cetirizine (ZYRTEC) 10 MG tablet, Take 10 mg by mouth., Disp: , Rfl:     clindamycin (CLEOCIN) 300 MG capsule, Take 1 capsule (300 mg total) by mouth 2 (two) times a day., Disp: , Rfl:     clindamycin (CLEOCIN) 300 MG capsule, Take 1 capsule (300 mg total) by mouth 2 (two) times a day., Disp: 7 capsule, Rfl: 0    gabapentin (NEURONTIN) 100 MG capsule, Take 100 mg by mouth 3 (three) times daily., Disp: , Rfl:     hydroCHLOROthiazide (HYDRODIURIL) 12.5 MG Tab, Take 12.5 mg by mouth., Disp: , Rfl:     HYDROcodone-acetaminophen (NORCO) 7.5-325 mg per tablet, Take  "by mouth., Disp: , Rfl:     ibuprofen (ADVIL,MOTRIN) 800 MG tablet, TAKE 1 TABLET BY MOUTH EVERY 8 HOURS AS NEEDED WITH  FOOD  OR  MILK  FOR  30  DAYS for 30, Disp: , Rfl:     montelukast (SINGULAIR) 10 mg tablet, Take 10 mg by mouth., Disp: , Rfl:     mupirocin (BACTROBAN) 2 % ointment, Apply topically 2 (two) times daily., Disp: 30 g, Rfl: 1    traZODone (DESYREL) 100 MG tablet, Take 100 mg by mouth every evening., Disp: , Rfl:    Review of patient's allergies indicates:   Allergen Reactions    Penicillins Shortness Of Breath     Other reaction(s): sob       DIAGNOSTICS      Labs/Scans/Micro:    CBC:   Lab Results   Component Value Date    WBC 4.83 07/24/2023    HGB 11.9 (L) 07/24/2023    HCT 36.2 (L) 07/24/2023    MCV 90.3 07/24/2023     07/24/2023       Sedimentation rate: No results found for: "SEDRATE" C-reactive protein: No results found for: "CRP" Chemistry: [unfilled]  HBA1C: No components found for: "HBA1C"    Ankle Brachial Index: No results found for this or any previous visit.      Imaging:    Plain film: No results found for this or any previous visit.    MRI: No results found for this or any previous visit.   No results found for this or any previous visit.    Bone Scan: No results found for this or any previous visit.   No results found for this or any previous visit.      Vascular studies:     Microbiology: Patient currently on ABX; refill sent - patient did not start abx    HOME HEALTH AGENCY:    TIMES PER WEEK/DAYS:    WOUND CARE ORDERS:  Left ear wound: Cleanseand apply mupirocin ointment to wound bed and reapply daily. Patient is to discontinue Mupirocin on Sunday (8/13) and leave GERTRUDE      Follow up in about 1 week (around 8/16/2023) for ear.       "

## 2023-08-16 ENCOUNTER — HOSPITAL ENCOUNTER (OUTPATIENT)
Dept: WOUND CARE | Facility: HOSPITAL | Age: 53
Discharge: HOME | End: 2023-08-16
Attending: NURSE PRACTITIONER
Payer: MEDICAID

## 2023-08-16 VITALS
RESPIRATION RATE: 18 BRPM | DIASTOLIC BLOOD PRESSURE: 64 MMHG | HEART RATE: 69 BPM | SYSTOLIC BLOOD PRESSURE: 118 MMHG | HEIGHT: 62 IN | BODY MASS INDEX: 31.83 KG/M2 | WEIGHT: 173 LBS

## 2023-08-16 DIAGNOSIS — L76.82 PAIN AT SURGICAL INCISION: ICD-10-CM

## 2023-08-16 DIAGNOSIS — G89.18 PAIN AT SURGICAL SITE: ICD-10-CM

## 2023-08-16 DIAGNOSIS — T81.49XA POSTOPERATIVE WOUND INFECTION: Primary | ICD-10-CM

## 2023-08-16 DIAGNOSIS — L03.90 CELLULITIS, UNSPECIFIED CELLULITIS SITE: ICD-10-CM

## 2023-08-16 PROCEDURE — 27000999 HC MEDICAL RECORD PHOTO DOCUMENTATION

## 2023-08-16 PROCEDURE — 99212 OFFICE O/P EST SF 10 MIN: CPT

## 2023-08-16 NOTE — PROGRESS NOTES
Subjective:       Patient ID: Radha Finch is a 53 y.o. female.    Chief Complaint: Wound Care (Left ear)    HPI  7/27/23 -     July 12th: Patient started experiencing redness, swelling, and pain noted to area. Left GERTRUDE  July 16th: Pain continued to worsen; presented to [facility] and received PO Clindamycin  July 18th: Pain continued to worsen; presented to ED where area was I&D and patient was instructed to continue Clindamycin and added PO Bactrim DS   July 19th: Patient presented to PCP office; Dr. Sofia was out of office so patient was seen by Dr. Cornell. Dr. Cornell ordered debridement by Dr. Hiro David.  July 21st: Patient was admitted to [facility] and placed on IV Vancomycin.  July 22nd: Patient had I&D by Dr. Hiro David   July 24th: Patient discharged and sent home on PO Clindamycin   July 27th: Follow up with Dr. Sofia where patient was referred to wound care     Today she was assessed by Dr. Sofia who attempted to clean the wound in her office.  However, the patient was an extreme amount of pain and could not tolerate.  She was referred to wound clinic.    Topical lidocaine (5%) was applied to the wound bed and after few minutes, cleansing and debridement began.  She was able to tolerate and a moderate amount of hair and dried fibrin was removed from the wound area along with a moderate amount of slough and purulence drainage.  The reports that she does continue to take her clindamycin as ordered.  She was instructed to wash the area daily with Dial soap once, and cleansed the area the second time daily with Vashe which she was provided with.  Both time she is to follow up the cleansing with mupirocin ointment and keep the wound area clean and covered with a bandage to allow it to remain somewhat moist so that the fibrin does not dry over the wound bed which will allow it to continue to dry.  She understood all instructions and will return to clinic on  Monday for a followup provider visit.  We will determine at that time if she needs additional antibiotics and for a thorough cleansing and debridement.  Today there were no signs and symptoms of infection, no odor, no erythema, no warmth.  However, there was still purulence drainage in the wound bed.    23 - Ms. Finch today for followup on the open wound to the left ear.  Today there is considerable improvement over her initial visit on 2023.  There is still a moderate quantity of slough in the wound bed and it is still very sensitive to the touch, however, the swelling has gone down substantially and it it is much less sensitive.  She continues to take her p.o. clindamycin and has 1 day remaining.  A refill has been sent for an additional 7 days.  She is also still currently applying mupirocin ointment to the wound bed twice daily as well as washing with Dial soap once daily.  Today, the wound bed was selectively debrided with removal of slough and eschar.  She will return on Thursday for a followup visit.    8/3/23 - Ms. Finch follows up today for the open wound at the left ear.  This wound continues to improve considerably over each week.  She is doing an excellent job with cleansing the wound and applying topical antibiotics.  Again today the wound was selectively debrided with removal of a moderate amount of slough and eschar.  It was noted that she does have a decreased amount of pain each week.  She will continue to apply mupirocin ointment to the wound bed daily as well as cleansing with Dial soap.  She has not begun taking the refill of Clindamycin.  We discussed today that it is not needed at this time.    23 - Ms. Finch returns today for followup on the wound to the left ear.  Again today the wound has improved considerably, and at this point there is a very small area that remains open.  She will continue to apply mupirocin ointment through Saturday evening, washing daily with  soap.   On Sunday she will stop the mupirocin ointment and just wash the wound daily.  She will return to clinic on Wednesday to reassess wound without the use of mupirocin to see if there is any deterioration.  She was instructed that she can leave the area open to air with just the mupirocin if she is just around the home.  If she is at work she is to cover the wound and continue to wear her hair net.    8/16/23 - The patient returns today for f/up on the wound to the left ear.  Today there is a small area of thin epithelial tissue at the space between the head and helix.  The remainder of the original wound is now healed.  She denies pain, drainage.  There are no S & S of infection.  She will continue washing the ear with Dial soap, leaving to air dry.  She will wear a hairnet at work for another week or two, and leave the area GERTRUDE at home.  She will return to clinic only if the wound reopens or deteriorates.      Review of Systems      Objective:      Vitals:    08/16/23 1317   BP: 118/64   Pulse: 69   Resp: 18         Physical Exam        8/16/23    Left ear (HEALED)    Assessment:         ICD-10-CM ICD-9-CM   1. Postoperative wound infection  T81.49XA 998.59   2. Pain at surgical site  G89.18 338.18   3. Pain at surgical incision  L76.82 782.0   4. Cellulitis, unspecified cellulitis site  L03.90 682.9           Procedures:     Mechanical debridement only    [] Yes [] No   I & D performed  [] Yes [] No   Excisional debridement performed   [] Yes [] No   Selective debridement performed           [x] Yes [] No   Mechanical debridement performed    [] Yes [] No  Silver nitrate applied                                     [] Yes [] No  Labs ordered this visit                                  [] Yes [] No   Imaging ordered this visit                           [] Yes [] No   Tissue pathology and/or culture taken         MEDICATIONS    Current Outpatient Medications:     acetaminophen (TYLENOL) 325 MG tablet, Take 2 tablets  "(650 mg total) by mouth every 8 (eight) hours as needed for Temperature greater than (or equal to 101 degree F)., Disp: , Rfl: 0    albuterol (PROVENTIL/VENTOLIN HFA) 90 mcg/actuation inhaler, 2 puffs every 6 (six) hours as needed., Disp: , Rfl:     atorvastatin (LIPITOR) 10 MG tablet, Take 1 tablet by mouth once daily for 90 days Orally Once a day for 90 days, Disp: , Rfl:     cetirizine (ZYRTEC) 10 MG tablet, Take 10 mg by mouth., Disp: , Rfl:     gabapentin (NEURONTIN) 100 MG capsule, Take 100 mg by mouth 3 (three) times daily., Disp: , Rfl:     hydroCHLOROthiazide (HYDRODIURIL) 12.5 MG Tab, Take 12.5 mg by mouth., Disp: , Rfl:     HYDROcodone-acetaminophen (NORCO) 7.5-325 mg per tablet, Take by mouth., Disp: , Rfl:     ibuprofen (ADVIL,MOTRIN) 800 MG tablet, TAKE 1 TABLET BY MOUTH EVERY 8 HOURS AS NEEDED WITH  FOOD  OR  MILK  FOR  30  DAYS for 30, Disp: , Rfl:     montelukast (SINGULAIR) 10 mg tablet, Take 10 mg by mouth., Disp: , Rfl:     traZODone (DESYREL) 100 MG tablet, Take 100 mg by mouth every evening., Disp: , Rfl:    Review of patient's allergies indicates:   Allergen Reactions    Penicillins Shortness Of Breath     Other reaction(s): sob       DIAGNOSTICS      Labs/Scans/Micro:    CBC:   Lab Results   Component Value Date    WBC 4.83 07/24/2023    HGB 11.9 (L) 07/24/2023    HCT 36.2 (L) 07/24/2023    MCV 90.3 07/24/2023     07/24/2023       Sedimentation rate: No results found for: "SEDRATE" C-reactive protein: No results found for: "CRP" Chemistry: [unfilled]  HBA1C: No components found for: "HBA1C"    Ankle Brachial Index: No results found for this or any previous visit.      Imaging:    Plain film: No results found for this or any previous visit.    MRI: No results found for this or any previous visit.   No results found for this or any previous visit.    Bone Scan: No results found for this or any previous visit.   No results found for this or any previous visit.      Vascular studies: "     Microbiology: abx complete    HOME HEALTH AGENCY:    TIMES PER WEEK/DAYS:    WOUND CARE ORDERS:        Follow up if symptoms worsen or fail to improve.

## 2023-08-21 LAB — FUNGUS SPEC CULT: NORMAL

## 2023-08-29 DIAGNOSIS — M79.641 BILATERAL HAND PAIN: Primary | ICD-10-CM

## 2023-08-29 DIAGNOSIS — M79.642 BILATERAL HAND PAIN: Primary | ICD-10-CM

## 2023-09-07 ENCOUNTER — HOSPITAL ENCOUNTER (OUTPATIENT)
Dept: RADIOLOGY | Facility: HOSPITAL | Age: 53
Discharge: HOME | End: 2023-09-07
Attending: FAMILY MEDICINE
Payer: MEDICAID

## 2023-09-07 DIAGNOSIS — M79.644 FINGER PAIN, RIGHT: ICD-10-CM

## 2023-09-07 PROCEDURE — 73130 X-RAY EXAM OF HAND: CPT | Mod: TC,RT

## 2023-09-07 PROCEDURE — 73110 X-RAY EXAM OF WRIST: CPT | Mod: TC,RT

## 2023-09-27 DIAGNOSIS — M79.641 PAIN IN BOTH HANDS: Primary | ICD-10-CM

## 2023-09-27 DIAGNOSIS — M79.642 PAIN IN BOTH HANDS: Primary | ICD-10-CM

## 2023-10-30 PROBLEM — G89.18 PAIN AT SURGICAL SITE: Status: RESOLVED | Noted: 2023-07-31 | Resolved: 2023-10-30

## 2023-11-29 ENCOUNTER — HOSPITAL ENCOUNTER (OUTPATIENT)
Dept: RADIOLOGY | Facility: HOSPITAL | Age: 53
Discharge: HOME | End: 2023-11-29
Attending: STUDENT IN AN ORGANIZED HEALTH CARE EDUCATION/TRAINING PROGRAM
Payer: MEDICAID

## 2023-11-29 ENCOUNTER — OFFICE VISIT (OUTPATIENT)
Dept: ORTHOPEDICS | Facility: CLINIC | Age: 53
End: 2023-11-29
Payer: MEDICAID

## 2023-11-29 VITALS
DIASTOLIC BLOOD PRESSURE: 82 MMHG | WEIGHT: 173 LBS | BODY MASS INDEX: 31.83 KG/M2 | SYSTOLIC BLOOD PRESSURE: 133 MMHG | HEIGHT: 62 IN | HEART RATE: 71 BPM

## 2023-11-29 DIAGNOSIS — M19.041 OA (OSTEOARTHRITIS) OF FINGER, RIGHT: ICD-10-CM

## 2023-11-29 DIAGNOSIS — M79.642 PAIN IN BOTH HANDS: ICD-10-CM

## 2023-11-29 DIAGNOSIS — M79.641 PAIN IN BOTH HANDS: ICD-10-CM

## 2023-11-29 DIAGNOSIS — M19.042 OA (OSTEOARTHRITIS) OF FINGER, LEFT: ICD-10-CM

## 2023-11-29 DIAGNOSIS — G56.03 BILATERAL CARPAL TUNNEL SYNDROME: Primary | ICD-10-CM

## 2023-11-29 PROCEDURE — 73130 X-RAY EXAM OF HAND: CPT | Mod: TC,LT

## 2023-11-29 PROCEDURE — 73130 X-RAY EXAM OF HAND: CPT | Mod: TC,RT

## 2023-11-29 PROCEDURE — 99213 OFFICE O/P EST LOW 20 MIN: CPT | Mod: PBBFAC

## 2023-11-29 RX ORDER — TRIAMCINOLONE ACETONIDE 40 MG/ML
40 INJECTION, SUSPENSION INTRA-ARTICULAR; INTRAMUSCULAR ONCE
Status: ACTIVE | OUTPATIENT
Start: 2023-11-29

## 2023-11-29 RX ORDER — VENLAFAXINE 37.5 MG/1
37.5 TABLET ORAL
COMMUNITY

## 2023-11-29 RX ORDER — LIDOCAINE HYDROCHLORIDE 10 MG/ML
1 INJECTION, SOLUTION EPIDURAL; INFILTRATION; INTRACAUDAL; PERINEURAL
Status: ACTIVE | OUTPATIENT
Start: 2023-11-29

## 2023-11-29 NOTE — PROGRESS NOTES
"Subjective:    Patient ID: Radha Finch is a right handed 53 y.o. female  who presented to [facility] for new visit..      Chief Complaint: Pain of the Right Hand (Pt is here for bilateral hand pain. Pain in hands started about 2-3 years ago, pain is constant but gets worse when trying to move hands or pick something up. Takes Ibuprofen for pain as needed. Has not done any PT.  ) and Pain of the Left Hand      History of Present Illness:    Radha Finch who has a history of lumbar DDD presented today with  bilateral hand pain for the past 3 years.  Pain is located at globally over both hands. Quality of pain is described as stabbing. A/w weakness and numbness, she drops things frequently.  Non-radiating. Inciting event: this is a longstanding problem which has been getting worse.  Pain is aggravated by twisting, repetitive use . There is a history of overuse. Evaluation to date: plain films, PCP evaluation, and EMG . Treatment to date: ibuprofen 800mg bid for 5-6months. Occupation includes works at a [employer].    Hand Review of Systems:  Swelling?  yes  Instability?  yes  Clicking?  no  Limited ROM? yes  Fever/Chills? no  Subluxation? no  Dislocation? no  Numbness/Tingling? yes  Weakness? yes       Objective:      Physical Exam:    /82   Pulse 71   Ht 5' 2" (1.575 m)   Wt 78.5 kg (173 lb)   BMI 31.64 kg/m²     Ortho/SPM Exam    Appearance:  Raz neck deformities of the 4th and 5th digits bilateraly with hyper extension of the PIP joints of the 2nd through 5th fingers bilaterally.  Soft tissue swelling: Left: no Right: no  Effusion: Left:  Negative Right: Negative  Erythema: Left no Right: no  Ecchymosis: Left: no Right: no  Atrophy: Left: no Right: no    Palpation:  Bilateral Global tenderness of the fingers and wrist.    Range of motion:  Flexion (0-80): Left:  80 Right: 80  Extension (0-70): Left:  60 Right: 60  Ulnar deviation (0-30): 30 Right: " 30  Radial deviation (0-20): 20 Right: 20  Supination (0-90): Left: 90 Right: 90  Pronation (0-90): Left: 90 Right: 90  Able to make a power fist and claw hand: on Both hand(s)  Distal palmar crease-finger tip distance: 0 on Both hand(s)    Strength:  Flexion: Left: 5/5 Pain: no Right: 5/5 Pain: no  Extension: Left: 5/5 Pain: no Right: 5/5 Pain: no  Supination: Left: 5/5 Pain: no Right: 5/5 Pain: no  Pronation: Left: 5/5 Pain: no Right: 5/5 Pain: no  Ulnar deviation: Left: 5/5 Pain: no Right: 5/5 Pain: no  Radial deviation: Left: 5/5 Pain: no Right: 5/5 Pain: no    Special Tests:  Durkans Test (Carpal Compression test): Positive bilaterally   Tinels:  Positive bilaterally at the wrist and bilaterally at the cubital tunnel.    Phalens:  Positive bilaterally  Stephanie Litler test:  Left: Negative  Right: Negative    UCL laxity: Left: Not performed  Right: Not performed  FDP test: Left: Negative  Right: Negative  FDS test: Left: Negative  Right: Negative  Reverse Phalens: Left: Not performed Right: Not performed  Finkelstein's Test:  Positive bilaterally    Froments: Left: Not performed Right: Not performed  Shuck Test: Left: Not performed Right: Not performed    AIN/PIN/Ulnar nerve: Intact and symmetric    General appearance: NAD  Peripheral pulses: normal bilaterally   Reflexes: Left: Not performed Right: Not performed   Sensation: normal    Labs:  Last A1c: 5.7     Imaging:   Previous images reviewed.  X-rays ordered and performed today: yes  # of views: 3 Laterality: bilateral  My Interpretation:  Distal Radial ulnar joint space is overall Normal on AP views. Scapholunate interval distance is Normal on bilateral AP views. A DISI/VISI is not suggested on bilateral hand series. Negative/positive ulnar variance is not suggested on lbilateral lateral views.  Moderate osteoarthritis of the PIP and DIP joints bilaterally.      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       Assessment:        Encounter Diagnoses   Code Name Primary?    G56.03 Bilateral carpal tunnel syndrome Yes    M19.042 OA (osteoarthritis) of finger, left     M19.041 OA (osteoarthritis) of finger, right         Plan:     MDM: Prior external referring provider notes reviewed. Prior external referring provider studies reviewed.   Dx: Bilateral CTS, bilateral hand OA, suspect RA.  New problem in moderate exacerbation.  Treatment Plan: Discussed with patient diagnosis and treatment recommendations.   Discussed nonoperative versus operative management, patient elected for nonoperative treatment today.  Patient consenting for bilateral CSI for carpal tunnel today.  Recommend nighttime wrist brace.  Imaging: radiological studies ordered and independently reviewed; discussed with patient; pending radiologist interpretation.   Procedure:  See above  Activity: Activity as tolerated  Therapy: No formal therapy  Medication: CONTINUE previously prescribed medication , see list . Please see your primary care physician for further refills.  RTC: PRN; call if any issues.         Carpal Tunnel    Date/Time: 11/29/2023 9:50 AM    Performed by: Shay Wing MD  Authorized by: Shay Wing MD    Consent Done?:  Yes (Written)  Indications:  Pain and diagnostic evaluation  Site marked: the procedure site was marked    Timeout: prior to procedure the correct patient, procedure, and site was verified    Local anesthesia used?: Yes    Local anesthetic:  Topical anesthetic  Location:  Wrist  Needle size:  25 G  Approach:  Volar  Patient tolerance:  Patient tolerated the procedure well with no immediate complications     Additional Comments: Staff: Jian Tavera MD    Risks:  Possible complications with the injection include bleeding, infection (.01%), tendon rupture, steroid flare, fat pad or soft tissue atrophy, skin depigmentation, allergic reaction to medications and vasovagal response. (steroid flare  treatment is rest, ice, NSAIDs and resolves in 24-36 hours.)    Consent:  No absolute contraindications (cellulitis overlying joint, infection, lack of informed consent, allergy to injection medication, AVN protein or egg allergy for sodium hyaluronate, or history of steroid flare) or relative contraindications (uncontrolled DM2 A1c>10, coagulopathy, INR > 3.5, previous joint replacement or history of AVN).        Description:  The patient was prepped in normal sterile fashion use of chlorhexidine scrub and the appropriate and anatomic landmarks were identified with ultrasound.  Contents of syringe included: 1cc of 1% lidocaine with 40mg of Kenalog     Post Procedure: Patient alert, and moving all extremities. ROM improved, pain decreased.  Good peripheral pulses, no signs of vascular compromise and range of motion intact.  Aftercare instructions were given to patient at time of discharge.  Relative rest for 3 days-avoiding excess activity.  Place ice on the area for 15 minutes every 4-6 hours. Patient may take Tylenol a 1000 mg b.i.d. or ibuprofen 600 mg t.i.d. for the next 3-4 days if not on medication already and safe to take pending co-morbidities.  Protect the area for the next 1-8 hours if anesthetic was used.  Avoid excessive activity for the next 3-4 weeks.  ER precautions given for fever, severe joint pain or allergic reaction or other new symptoms related to the joint injection.        Patient had immediate relief after injection.  Carpal Tunnel    Date/Time: 11/29/2023 9:50 AM    Performed by: Shay Wing MD  Authorized by: Shay Wing MD    Consent Done?:  Yes (Written)  Indications:  Pain and diagnostic evaluation  Site marked: the procedure site was marked    Timeout: prior to procedure the correct patient, procedure, and site was verified    Local anesthesia used?: Yes    Local anesthetic:  Topical anesthetic  Location:  Wrist  Needle size:  25 G  Approach:  Volar  Patient tolerance:  Patient  tolerated the procedure well with no immediate complications     Additional Comments: Staff: Jian Tavera MD    Risks:  Possible complications with the injection include bleeding, infection (.01%), tendon rupture, steroid flare, fat pad or soft tissue atrophy, skin depigmentation, allergic reaction to medications and vasovagal response. (steroid flare treatment is rest, ice, NSAIDs and resolves in 24-36 hours.)    Consent:  No absolute contraindications (cellulitis overlying joint, infection, lack of informed consent, allergy to injection medication, AVN protein or egg allergy for sodium hyaluronate, or history of steroid flare) or relative contraindications (uncontrolled DM2 A1c>10, coagulopathy, INR > 3.5, previous joint replacement or history of AVN).        Description:  The patient was prepped in normal sterile fashion use of chlorhexidine scrub and the appropriate and anatomic landmarks were identified with ultrasound.  Contents of syringe included: 1cc of 1% lidocaine with 40mg of Kenalog     Post Procedure: Patient alert, and moving all extremities. ROM improved, pain decreased.  Good peripheral pulses, no signs of vascular compromise and range of motion intact.  Aftercare instructions were given to patient at time of discharge.  Relative rest for 3 days-avoiding excess activity.  Place ice on the area for 15 minutes every 4-6 hours. Patient may take Tylenol a 1000 mg b.i.d. or ibuprofen 600 mg t.i.d. for the next 3-4 days if not on medication already and safe to take pending co-morbidities.  Protect the area for the next 1-8 hours if anesthetic was used.  Avoid excessive activity for the next 3-4 weeks.  ER precautions given for fever, severe joint pain or allergic reaction or other new symptoms related to the joint injection.        Patient had immediate relief after injection.      Shay Wing MD.  Note dictated using MModal.

## 2023-12-14 NOTE — PROGRESS NOTES
Faculty Attestation: Radha RITA Finch  was seen in [facility] Medicine [facility]. Discussed with Dr. Wing at the time of the visit. History of Present Illness, Physical Exam, and Assessment and Plan reviewed. Treatment plan is reasonable and appropriate. Compliance with treatment recommendations is important.  Radiology images independently reviewed and agree with fellow interpretation.  Procedure note reviewed. Patient tolerated procedure well.      Jian Tavera MD  Sports Medicine

## 2024-07-29 ENCOUNTER — HOSPITAL ENCOUNTER (EMERGENCY)
Facility: HOSPITAL | Age: 54
Discharge: HOME | End: 2024-07-29
Attending: INTERNAL MEDICINE

## 2024-07-29 VITALS
SYSTOLIC BLOOD PRESSURE: 134 MMHG | TEMPERATURE: 99 F | RESPIRATION RATE: 17 BRPM | WEIGHT: 163 LBS | DIASTOLIC BLOOD PRESSURE: 75 MMHG | HEIGHT: 62 IN | BODY MASS INDEX: 30 KG/M2 | HEART RATE: 82 BPM | OXYGEN SATURATION: 99 %
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
## 2024-07-29 DIAGNOSIS — M25.551 RIGHT HIP PAIN: ICD-10-CM

## 2024-07-29 DIAGNOSIS — M25.511 ACUTE PAIN OF RIGHT SHOULDER: ICD-10-CM

## 2024-07-29 DIAGNOSIS — M79.18 MUSCULOSKELETAL PAIN: ICD-10-CM

## 2024-07-29 PROCEDURE — 63600175 PHARM REV CODE 636 W HCPCS: Performed by: PHYSICIAN ASSISTANT

## 2024-07-29 PROCEDURE — 96372 THER/PROPH/DIAG INJ SC/IM: CPT | Performed by: PHYSICIAN ASSISTANT

## 2024-07-29 PROCEDURE — 99284 EMERGENCY DEPT VISIT MOD MDM: CPT | Mod: 25

## 2024-07-29 RX ORDER — KETOROLAC TROMETHAMINE 30 MG/ML
60 INJECTION, SOLUTION INTRAMUSCULAR; INTRAVENOUS
Status: COMPLETED | OUTPATIENT
Start: 2024-07-29 | End: 2024-07-29

## 2024-07-29 RX ORDER — DICLOFENAC SODIUM 50 MG/1
50 TABLET, DELAYED RELEASE ORAL 3 TIMES DAILY
Qty: 21 TABLET | Refills: 0 | Status: SHIPPED | OUTPATIENT
Start: 2024-07-29 | End: 2024-08-05

## 2024-07-29 RX ORDER — METHOCARBAMOL 750 MG/1
1500 TABLET, FILM COATED ORAL 3 TIMES DAILY
Qty: 42 TABLET | Refills: 0 | Status: SHIPPED | OUTPATIENT
Start: 2024-07-29 | End: 2024-08-05

## 2024-07-29 RX ADMIN — KETOROLAC TROMETHAMINE 60 MG: 30 INJECTION, SOLUTION INTRAMUSCULAR at 05:07

## 2024-07-29 NOTE — ED PROVIDER NOTES
Encounter Date: 7/29/2024       History     Chief Complaint   Patient presents with    Fall     C/o fall yesterday, right shoulder, right back, right arm, right leg pain     54-year-old female presents to ED after fall yesterday.  Patient reports that she was walking when she tripped and fell on her right side.  Denies hitting her head or loss of consciousness.  Patient complains of shoulder pain that radiates down her right arm.  Patient also complains of right hip pain that radiates down into her leg.  Reports pain worse with movements and palpation.  Denies taking any medication today.  Patient reports going to work today.    The history is provided by the patient. No  was used.     Review of patient's allergies indicates:   Allergen Reactions    Penicillins Shortness Of Breath     Other reaction(s): sob     Past Medical History:   Diagnosis Date    Environmental allergies     Hyperlipidemia     Hypertension     WPW (Hang-Parkinson-White syndrome)      Past Surgical History:   Procedure Laterality Date    COLONOSCOPY  06/08/2021    CYSTOSCOPY  04/27/2022    HYSTERECTOMY, VAGINAL, LAPAROSCOPY-ASSISTED, WITH SALPINGO-OOPHORECTOY  04/27/2022    INCISION AND DRAINAGE OF ABSCESS Left 7/22/2023    Procedure: INCISION AND DRAINAGE, ABSCESS;  Surgeon: Hal David MD;  Location: [facility];  Service: General;  Laterality: Left;  LEFT UPPER EAR     Family History   Problem Relation Name Age of Onset    Heart disease Mother      Diabetes Mother      Diabetes Father      Heart disease Father       Social History     Tobacco Use    Smoking status: Never    Smokeless tobacco: Never   Substance Use Topics    Alcohol use: Not Currently     Review of Systems   Constitutional:  Negative for chills, fatigue and fever.   Respiratory:  Negative for shortness of breath.    Cardiovascular:  Negative for chest pain.   Gastrointestinal:  Negative for abdominal pain, diarrhea, nausea and vomiting.   Genitourinary:   Negative for dysuria, flank pain, frequency and urgency.   Musculoskeletal:  Positive for arthralgias, joint swelling and myalgias. Negative for back pain.   All other systems reviewed and are negative.      Physical Exam     Initial Vitals [07/29/24 1741]   BP Pulse Resp Temp SpO2   138/78 81 16 98.2 °F (36.8 °C) 100 %      MAP       --         Physical Exam    Nursing note and vitals reviewed.  Constitutional: She appears well-developed and well-nourished.   HENT:   Head: Normocephalic and atraumatic.   Right Ear: Tympanic membrane and external ear normal.   Left Ear: Tympanic membrane and external ear normal.   Mouth/Throat: Uvula is midline, oropharynx is clear and moist and mucous membranes are normal. No trismus in the jaw. No uvula swelling. No oropharyngeal exudate, posterior oropharyngeal edema or posterior oropharyngeal erythema.   Eyes: Conjunctivae are normal. Pupils are equal, round, and reactive to light.   Neck: Neck supple.   Normal range of motion.  Cardiovascular:  Normal rate, regular rhythm and normal heart sounds.           Pulmonary/Chest: Breath sounds normal. She has no wheezes. She has no rhonchi. She has no rales.   Abdominal: Abdomen is soft. Bowel sounds are normal. There is no abdominal tenderness.   Musculoskeletal:         General: Normal range of motion.      Right shoulder: Tenderness present. No swelling, deformity, effusion, bony tenderness or crepitus.        Arms:       Cervical back: Normal, normal range of motion and neck supple.      Thoracic back: Normal.      Lumbar back: Normal.      Right hip: Tenderness and bony tenderness present. No deformity. Normal range of motion.      Comments: Patient has full range of motion of all joints.  Reports pain with movement.  DP and radial pulses 2+.     Neurological: She is alert and oriented to person, place, and time.   Skin: Skin is warm and dry.   Psychiatric: She has a normal mood and affect.         ED Course   Procedures  Labs  Reviewed - No data to display       Imaging Results              X-Ray Shoulder Trauma Right (Preliminary result)  Result time 07/29/24 18:15:42      Wet Read by Umu Johnson PA (07/29/24 18:15:42, [facility], Emergency Medicine)    No acute fracture or dislocation noted                                      X-Ray Hip 2 or 3 views Right with Pelvis when performed (Preliminary result)  Result time 07/29/24 18:21:32      Wet Read by Umu Johnson PA (07/29/24 18:21:32, [facility], Emergency Medicine)    No acute fracture or dislocation noted.                                     Medications   ketorolac injection 60 mg (60 mg Intramuscular Given 7/29/24 1755)     Medical Decision Making  54-year-old female presents to ED after fall yesterday.  Patient reports that she was walking when she tripped and fell on her right side.  Denies hitting her head or loss of consciousness.  Patient complains of shoulder pain that radiates down her right arm.  Patient also complains of right hip pain that radiates down into her leg.  Reports pain worse with movements and palpation.  Denies taking any medication today.  Patient reports going to work today.    Differential diagnosis includes but isn't limited to fall, contusion, fracture, dislocation, musculoskeletal pain    Amount and/or Complexity of Data Reviewed  Radiology: ordered.  Discussion of management or test interpretation with external provider(s): Patient GCS 15 and neuro intact.  Ambulatory with steady gait.  Presents to ED for evaluation after fall yesterday.  Patient reports triple fall on her right side.  Complains of right shoulder and right hip pain.  Reports pain radiating down into her arm and leg.  Reports pain worse with movement.  No medications taken.  X-rays obtained no acute fractures noted.  Will discharge home with short course of NSAIDs and muscle relaxers.  Discussed likely contusion.  Discussed return  ED precautions.  Patient verbalizes understanding and agrees with plan of care.    Risk  OTC drugs.  Prescription drug management.                                      Clinical Impression:  Final diagnoses:  [W19.XXXA] Fall (Primary)  [M25.511] Acute pain of right shoulder  [M25.551] Right hip pain  [M79.18] Musculoskeletal pain          ED Disposition Condition    Discharge Stable          ED Prescriptions       Medication Sig Dispense Start Date End Date Auth. Provider    diclofenac (VOLTAREN) 50 MG EC tablet Take 1 tablet (50 mg total) by mouth 3 (three) times daily. for 7 days 21 tablet 7/29/2024 8/5/2024 Umu Johnson PA    methocarbamoL (ROBAXIN) 750 MG Tab Take 2 tablets (1,500 mg total) by mouth 3 (three) times daily. for 7 days 42 tablet 7/29/2024 8/5/2024 Umu Johnson PA          Follow-up Information       Follow up With Specialties Details Why Contact Info    Dina Sofia MD Family Medicine Call   [address]  [facility] [place] [address]  [phone]               Umu Johnson PA  07/29/24 2198

## 2024-07-29 NOTE — DISCHARGE INSTRUCTIONS
Use ice and heat therapy, 20 min on and 20 min off.     You have been prescribed Diclofenac for pain. This is an Non-Steroidal Anti-Inflammatory (NSAID) Medication. Please do not take any additional NSAIDs while you are taking this medication including (Advil, Aleve, Motrin, Ibuprofen, Mobic\meloxicam, Naprosyn, Toradol, ketoralac, etc.). Please stop taking this medication if you experience: weakness, itching, yellow skin or eyes, joint pains, vomiting blood, blood or black stools, unusual weight gain, or swelling in your arms, legs, hands, or feet.     You have been prescribed Robaxin (Methocarbamol) for muscle spasms/pain. Please do not take this medication while working, drinking alcohol, swimming, or while driving/operating heavy machinery. This medication may cause drowsiness, dizziness, impair judgment, and reduce physical capabilities.You should not drive, operate heavy machinery, or make life changing decisions while taking this medication.

## 2024-07-29 NOTE — Clinical Note
"Radha Carrillo" Stef was seen and treated in our emergency department on 7/29/2024.  She may return to work on 08/01/2024.       If you have any questions or concerns, please don't hesitate to call.      Umu Johnson PA"

## 2024-09-28 ENCOUNTER — HOSPITAL ENCOUNTER (EMERGENCY)
Facility: HOSPITAL | Age: 54
Discharge: HOME | End: 2024-09-28
Attending: EMERGENCY MEDICINE

## 2024-09-28 VITALS
HEART RATE: 79 BPM | TEMPERATURE: 98 F | OXYGEN SATURATION: 99 % | HEIGHT: 62 IN | WEIGHT: 163 LBS | BODY MASS INDEX: 30 KG/M2 | DIASTOLIC BLOOD PRESSURE: 86 MMHG | RESPIRATION RATE: 20 BRPM | SYSTOLIC BLOOD PRESSURE: 144 MMHG

## 2024-09-28 DIAGNOSIS — M54.31 SCIATICA OF RIGHT SIDE: Primary | ICD-10-CM

## 2024-09-28 PROCEDURE — 63600175 PHARM REV CODE 636 W HCPCS: Performed by: EMERGENCY MEDICINE

## 2024-09-28 PROCEDURE — 25000003 PHARM REV CODE 250: Performed by: EMERGENCY MEDICINE

## 2024-09-28 PROCEDURE — 99284 EMERGENCY DEPT VISIT MOD MDM: CPT | Mod: 25

## 2024-09-28 PROCEDURE — 96374 THER/PROPH/DIAG INJ IV PUSH: CPT

## 2024-09-28 RX ORDER — METHYLPREDNISOLONE 4 MG/1
TABLET ORAL
Qty: 21 EACH | Refills: 0 | Status: SHIPPED | OUTPATIENT
Start: 2024-09-28 | End: 2024-10-19

## 2024-09-28 RX ORDER — KETOROLAC TROMETHAMINE 30 MG/ML
30 INJECTION, SOLUTION INTRAMUSCULAR; INTRAVENOUS
Status: COMPLETED | OUTPATIENT
Start: 2024-09-28 | End: 2024-09-28

## 2024-09-28 RX ORDER — HYDROCODONE BITARTRATE AND ACETAMINOPHEN 5; 325 MG/1; MG/1
1 TABLET ORAL
Status: COMPLETED | OUTPATIENT
Start: 2024-09-28 | End: 2024-09-28

## 2024-09-28 RX ORDER — HYDROCODONE BITARTRATE AND ACETAMINOPHEN 5; 325 MG/1; MG/1
1 TABLET ORAL EVERY 6 HOURS PRN
Qty: 12 TABLET | Refills: 0 | Status: SHIPPED | OUTPATIENT
Start: 2024-09-28

## 2024-09-28 RX ADMIN — KETOROLAC TROMETHAMINE 30 MG: 30 INJECTION, SOLUTION INTRAMUSCULAR; INTRAVENOUS at 09:09

## 2024-09-28 RX ADMIN — HYDROCODONE BITARTRATE AND ACETAMINOPHEN 1 TABLET: 5; 325 TABLET ORAL at 11:09

## 2024-09-28 NOTE — ED PROVIDER NOTES
Encounter Date: 9/28/2024       History     Chief Complaint   Patient presents with    Back Pain     Pt c/o lower back pain radiating down rt leg. Pt states she has fall in July but no new injury.     HPI  54-year-old female history of HLD, hypertension, diabetes, previous episodes of sciatica on right now with complaints of 2 week history of right-sided back pain radiating to right leg which he states is due to her sciatica.  Patient denies saddle anesthesia, bowel and urinary incontinence and focal motor or sensory changes.  No history of recent trauma but patient states she did have a fall back in July and was never evaluated at that time.  Patient was seen by her primary care physician 5 days ago and was prescribed ibuprofen, gabapentin, tizanidine which patient states is not working for her pain.  Patient describes the pain as 8/10, increases with movement relieved by rest.  Review of patient's allergies indicates:   Allergen Reactions    Penicillins Shortness Of Breath     Other reaction(s): sob     Past Medical History:   Diagnosis Date    Environmental allergies     Hyperlipidemia     Hypertension     WPW (Hang-Parkinson-White syndrome)      Past Surgical History:   Procedure Laterality Date    COLONOSCOPY  06/08/2021    CYSTOSCOPY  04/27/2022    HYSTERECTOMY, VAGINAL, LAPAROSCOPY-ASSISTED, WITH SALPINGO-OOPHORECTOY  04/27/2022    INCISION AND DRAINAGE OF ABSCESS Left 7/22/2023    Procedure: INCISION AND DRAINAGE, ABSCESS;  Surgeon: Hal David MD;  Location: [facility];  Service: General;  Laterality: Left;  LEFT UPPER EAR     Family History   Problem Relation Name Age of Onset    Heart disease Mother      Diabetes Mother      Diabetes Father      Heart disease Father       Social History     Tobacco Use    Smoking status: Never    Smokeless tobacco: Never   Substance Use Topics    Alcohol use: Not Currently     Review of Systems   All other systems reviewed and are negative.      Physical Exam      Initial Vitals [09/28/24 0937]   BP Pulse Resp Temp SpO2   (!) 161/105 87 18 98.3 °F (36.8 °C) 99 %      MAP       --         Physical Exam    Nursing note and vitals reviewed.  Constitutional: She appears well-developed and well-nourished.   HENT:   Head: Normocephalic and atraumatic.   Eyes: Conjunctivae and EOM are normal. Pupils are equal, round, and reactive to light.   Neck: Neck supple.   Normal range of motion.  Cardiovascular:  Normal rate, regular rhythm, normal heart sounds and intact distal pulses.           Pulmonary/Chest: Breath sounds normal.   Abdominal: Abdomen is soft. Bowel sounds are normal.   Musculoskeletal:         General: Tenderness present.      Cervical back: Normal range of motion and neck supple.      Comments: Tender to palpation over right paraspinal area L2-L4 with no midline pain or vertebral step-offs.  Positive straight leg test.  Nonfocal neuro exam with normal motor or sensory function.     Neurological: She is alert and oriented to person, place, and time. She has normal strength. She displays normal reflexes. No cranial nerve deficit or sensory deficit. GCS score is 15. GCS eye subscore is 4. GCS verbal subscore is 5. GCS motor subscore is 6.   Skin: Skin is warm and dry.         ED Course   Procedures  Labs Reviewed - No data to display       Imaging Results              X-Ray Lumbar Spine Ap And Lateral (Final result)  Result time 09/28/24 10:38:13      Final result by Tomasz Castillo MD (09/28/24 10:38:13)                   Impression:      No acute osseous abnormality or static listhesis    Mild moderate lumbar spondylosis.      Electronically signed by: Tomasz Castillo MD  Date:    09/28/2024  Time:    10:38               Narrative:    EXAMINATION:  Lumbar spine 3 views    CLINICAL HISTORY:  Back pain    COMPARISON:  07/09/2020    FINDINGS:  There are 5 non rib-bearing lumbar type vertebra.  The lumbar lordosis maintained.  Vertebral body heights and posterior  alignment are maintained.  There is no acute fracture seen.  There is mild multilevel vertebral body spurs.  There is mild disc space narrowing noted at L5-S1.  There is mild moderate facet arthrosis at L4-5 L5-S1.                                       Medications   ketorolac injection 30 mg (30 mg Intravenous Given 9/28/24 0228)   HYDROcodone-acetaminophen 5-325 mg per tablet 1 tablet (1 tablet Oral Given 9/28/24 1105)     Medical Decision Making                   54-year-old female history of previous right-sided sciatica now with complaints of three-week history of increasing pain to her right lower back radiating to her right leg consistent with her sciatica.  Patient was recently seen by her PCP who prescribed Motrin , tramadol, tizanidine and gabapentin which he states is not working for her.  Patient has nonfocal neuro exam is tender to palpation of the right lower back.  Vital signs stable in ED, afebrile, O2 sat 99% on room air.  LS spine with no acute findings.  Patient received Toradol 30 mg IV and is feeling much better at this time will be discharged home with Medrol Dosepak, limited Rx for Norco, close follow up with PCP in 1-2 days for recheck, return for worsening symptoms or any other concerns.                 Clinical Impression:  Final diagnoses:  [M54.31] Sciatica of right side (Primary)          ED Disposition Condition    Discharge Stable          ED Prescriptions       Medication Sig Dispense Start Date End Date Auth. Provider    methylPREDNISolone (MEDROL DOSEPACK) 4 mg tablet use as directed 21 each 9/28/2024 10/19/2024 Epifanio Michaels MD    HYDROcodone-acetaminophen (NORCO) 5-325 mg per tablet Take 1 tablet by mouth every 6 (six) hours as needed for Pain. 12 tablet 9/28/2024 -- Epifanio Michaels MD          Follow-up Information       Follow up With Specialties Details Why Contact Info    Dina Sofia MD Family Medicine Schedule an appointment as soon as possible for a  visit in 1 day  [address]  [facility] [address]  [phone]               Epifanio Michaels MD  09/28/24 6830

## (undated) DEVICE — SUPPORT ULNA NERVE PROTECTOR

## (undated) DEVICE — NDL SAFETY 22G X 1.5 ECLIPSE

## (undated) DEVICE — GLOVE PROTEXIS HYDROGEL SZ6.5

## (undated) DEVICE — Device

## (undated) DEVICE — SET ADMIN GRAV VLV 17ML 104IN

## (undated) DEVICE — PAD ELECTROSURGICAL SPL W/CORD

## (undated) DEVICE — SYR 10CC LUER LOCK

## (undated) DEVICE — TRAY DRY SKIN SCRUB PREP

## (undated) DEVICE — GLOVE PROTEXIS BLUE LATEX 6.5

## (undated) DEVICE — SPONGE GZ WOVEN 12-PLY 4X4IN